# Patient Record
Sex: FEMALE | Race: WHITE | NOT HISPANIC OR LATINO | Employment: FULL TIME | URBAN - METROPOLITAN AREA
[De-identification: names, ages, dates, MRNs, and addresses within clinical notes are randomized per-mention and may not be internally consistent; named-entity substitution may affect disease eponyms.]

---

## 2017-08-16 ENCOUNTER — OFFICE VISIT (OUTPATIENT)
Dept: FAMILY MEDICINE | Facility: CLINIC | Age: 22
End: 2017-08-16

## 2017-08-16 VITALS
TEMPERATURE: 98.4 F | RESPIRATION RATE: 18 BRPM | HEART RATE: 70 BPM | OXYGEN SATURATION: 100 % | SYSTOLIC BLOOD PRESSURE: 97 MMHG | BODY MASS INDEX: 17.83 KG/M2 | WEIGHT: 104.4 LBS | DIASTOLIC BLOOD PRESSURE: 64 MMHG | HEIGHT: 64 IN

## 2017-08-16 DIAGNOSIS — Z00.129 ENCOUNTER FOR ROUTINE CHILD HEALTH EXAMINATION WITHOUT ABNORMAL FINDINGS: Primary | ICD-10-CM

## 2017-08-16 LAB
% GRANULOCYTES: 57.9 %G (ref 40–75)
GRANULOCYTES #: 4.2 K/UL (ref 1.6–8.3)
HCT VFR BLD AUTO: 43.9 % (ref 35–47)
HEMOGLOBIN: 14.3 G/DL (ref 11.7–15.7)
LYMPHOCYTES # BLD AUTO: 2.6 K/UL (ref 0.8–5.3)
LYMPHOCYTES NFR BLD AUTO: 35.3 %L (ref 20–48)
MCH RBC QN AUTO: 31 PG (ref 26.5–35)
MCHC RBC AUTO-ENTMCNC: 32.6 G/DL (ref 32–36)
MCV RBC AUTO: 95.1 FL (ref 78–100)
MID #: 0.5 K/UL (ref 0–2.2)
MID %: 6.8 %M (ref 0–20)
PLATELET # BLD AUTO: 284 K/UL (ref 150–450)
RBC # BLD AUTO: 4.62 M/UL (ref 3.8–5.2)
WBC # BLD AUTO: 7.3 K/UL (ref 4–11)

## 2017-08-16 NOTE — MR AVS SNAPSHOT
After Visit Summary   8/16/2017    Priscilla Raymond    MRN: [id number]           Patient Information     Date Of Birth          1995        Visit Information        Provider Department      8/16/2017 1:00 PM Brianna Harris MD [facility]        Today's Diagnoses     Encounter for routine child health examination without abnormal findings    -  1      Care Instructions    Here is the plan from today's visit    1. Encounter for routine child health examination without abnormal findings    - CBC with Diff Plt ([facility])  - HIV Antigen Antibody Combo          Please call or return to clinic if your symptoms don't go away.    Follow up plan  Please make a clinic appointment for follow up with me (BRIANNA HARRIS) as needed.    Thank you for coming to [facility] today.  Lab Testing:  **If you had lab testing today and your results are reassuring or normal they will be mailed to you or sent through [facility] within 7 days.   **If the lab tests need quick action we will call you with the results.  The phone number we will call with results is # [phone] (home) . If this is not the best number please call our clinic and change the number.  Medication Refills:  If you need any refills please call your pharmacy and they will contact us.   If you need to  your refill at a new pharmacy, please contact the new pharmacy directly. The new pharmacy will help you get your medications transferred faster.   Scheduling:  If you have any concerns about today's visit or wish to schedule another appointment please call our office during normal business hours [phone] (8-5:00 M-F)  If a referral was made to a [facility] and you don't get a call from central scheduling please call [phone].  If a Mammogram was ordered for you at [facility] call [phone] to schedule or change your appointment.  If you had an XRay/CT/Ultrasound/MRI ordered the  "number is [phone] to schedule or change your radiology appointment.   Medical Concerns:  If you have urgent medical concerns please call [phone] at any time of the day.  If you have a medical emergency please call 911.            Follow-ups after your visit        Who to contact     Please call your clinic at [phone] to:    Ask questions about your health    Make or cancel appointments    Discuss your medicines    Learn about your test results    Speak to your doctor   If you have compliments or concerns about an experience at your clinic, or if you wish to file a complaint, please contact [facility] Patient Relations at [phone] or email us at [email or web address]         Additional Information About Your Visit        [employer] Information     Nearbox is an electronic gateway that provides easy, online access to your medical records. With Nearbox, you can request a clinic appointment, read your test results, renew a prescription or communicate with your care team.     To sign up for Nearbox visit the website at [email or web address]   You will be asked to enter the access code listed below, as well as some personal information. Please follow the directions to create your username and password.     Your access code is: [email or web address]  Expires: 2017  1:52 PM     Your access code will  in 90 days. If you need help or a new code, please contact your [facility] or call [phone] for assistance.        Care EveryWhere ID     This is your Care EveryWhere ID. This could be used by other organizations to access your [facility] medical records  [id number]        Your Vitals Were     Pulse Temperature Respirations Height Pulse Oximetry BMI (Body Mass Index)    70 98.4  F (36.9  C) (Oral) 18 5' 4\" (162.6 cm) 100% 17.92 kg/m2       Blood Pressure from Last 3 Encounters:   17 97/64   16 (!) 86/59    Weight from " Last 3 Encounters:   08/16/17 104 lb 6.4 oz (47.4 kg)   08/20/16 100 lb (45.4 kg) (3 %)*     * Growth percentiles are based on CDC 2-20 Years data.              We Performed the Following     CBC with Diff Plt ([facility])     HIV Antigen Antibody Combo        Primary Care Provider    None       No address on file        Equal Access to Services     ANSELMO [facility]: Hadii aad ku hadasho Soomaali, waaxda luqadaha, qaybta kaalmada adeegyada, waxay idiin hayaan adeeg christofer laNeftaliaan . So [facility] [phone].    ATENCIÓN: Si habla español, tiene a buck disposición servicios gratuitos de asistencia lingüística. Llame al [phone].    We comply with applicable federal civil rights laws and [place] laws. We do not discriminate on the basis of race, color, national origin, age, disability sex, sexual orientation or gender identity.            Thank you!     Thank you for choosing [facility]  for your care. Our goal is always to provide you with excellent care. Hearing back from our patients is one way we can continue to improve our services. Please take a few minutes to complete the written survey that you may receive in the mail after your visit with us. Thank you!             Your Updated Medication List - Protect others around you: Learn how to safely use, store and throw away your medicines at www.disposemymeds.org.          This list is accurate as of: 8/16/17  1:52 PM.  Always use your most recent med list.                   Brand Name Dispense Instructions for use Diagnosis    adapalene 0.1 % cream    DIFFERIN    45 g    Apply topically At Bedtime    Acne       clindamycin 1 % topical gel    CLINDAMAX    30 g    Apply topically every morning    Acne       COMPOUNDED NON-CONTROLLED SUBSTANCE - PHARMACY TO MIX COMPOUNDED MEDICATION    CMPD RX    1 Container    100 gm total  0.02 nifedipine 0.015 lidocaine  White petroleum  Apply small amount twice daily to the affected area    Anal fissure        drospirenone-ethinyl estradiol 3-0.02 MG per tablet    ROGERIO    3 Package    Take 1 tablet by mouth daily    Acne       melatonin 1 MG Tabs tablet      Take 1 mg by mouth Take 1 mg by mouth at bedtime as needed.        polyethylene glycol powder    MIRALAX    510 g    Take 17 g (1 capful) by mouth daily as needed for constipation    Constipation, unspecified constipation type

## 2017-08-16 NOTE — PROGRESS NOTES
"  Child & Teen Check Up Year 18-20         Health History       Growth Percentile:    Wt Readings from Last 3 Encounters:   08/16/17 104 lb 6.4 oz (47.4 kg)   08/20/16 100 lb (45.4 kg) (3 %)*     * Growth percentiles are based on [facility] 2-20 Years data.      Ht Readings from Last 2 Encounters:   08/16/17 5' 4\" (162.6 cm)   08/20/16 5' 4\" (162.6 cm) (45 %)*     * Growth percentiles are based on [facility] 2-20 Years data.    Normalized BMI data available only for age 0 to 20 years.    Visit Vitals: BP 97/64  Pulse 70  Temp 98.4  F (36.9  C) (Oral)  Resp 18  Ht 5' 4\" (162.6 cm)  Wt 104 lb 6.4 oz (47.4 kg)  SpO2 100%  BMI 17.92 kg/m2  BP Percentile: Normalized stature-for-age data not available for patients older than 20 years.    Informant: Patient    Patient, Family speaks English and so an  was not used.  Family History: History reviewed. No pertinent family history.    Social History:   Social History     Social History     Marital status: Single     Spouse name: N/A     Number of children: N/A     Years of education: N/A     Social History Main Topics     Smoking status: Never Smoker     Smokeless tobacco: Never Used     Alcohol use None     Drug use: None     Sexual activity: Not Asked     Other Topics Concern     None     Social History Narrative       Medical History: History reviewed. No pertinent past medical history.    Family History and past Medical History reviewed and unchanged/updated.    Parental/or patient concerns: No concerns    Daily Activities:    Nutrition:    Describe intake: Cereal, bagel, toast, orange juice, coffee, chicken, vegetables, noodles, rice, fish, ice cream (chocolate), pizza, apples, cheese, milk shakes    Environmental Risks:  TB exposure: No  Guns in house:Stored in locked case or with trigger guards with ammunition separate.  HIV Testing USPSTF \"B\": Recommended and patient accepted testing.  Sexual partners have been men. Most of times uses condoms.     Dental:  Have you " "been to a dentist this year? Yes and verbally encouraged family to continue to have annual dental check-up       Mental Health:  Teen Screen Discussed?:        HEADSSS SCREENING:    HOME  Do you get along with your parents/siblings? Sometimes  Do you have at least one adult you can really talk to? Yes    EDUCATION  Do you have career or college plans after high school? Going to Nursing school in [employer]    ACTIVITIES  Do you get some exercise at least 3 times a week? Yes  Do you feel you are about the right weight for your height? Yes    DRUGS  Do you smoke cigarettes or chew tobacco? No  Do you drink alcohol or use any type of drugs? Sometimes.Smoked weed. Last time probably about a month ago.    SEX  Have you ever had sex? Yes and Not been tested positive for any STI's in the past.     SUICIDE/DEPRESSION  Do you ever feel down or depressed? No    SAFETY  Do you feel afraid in any of your relationships? No  Nutrition:  Eating disorders, Safety:  Seat belt. and Guidance:  Birth control, STDs, safer sex.       ROS   GENERAL: no recent fevers and activity level has been normal  SKIN: Negative for rash, birthmarks, acne, pigmentation changes  HEENT: Negative for hearing problems, vision problems, nasal congestion, eye discharge and eye redness  RESP: No cough, wheezing, difficulty breathing  CV: No cyanosis, fatigue with feeding  GI: Normal stools for age, no diarrhea or constipation   : Normal urination, no disharge or painful urination  MS: No swelling, muscle weakness, joint problems  NEURO: Moves all extremeties normally, normal activity for age  ALLERGY/IMMUNE: See allergy in history         Physical Exam:   BP 97/64  Pulse 70  Temp 98.4  F (36.9  C) (Oral)  Resp 18  Ht 5' 4\" (162.6 cm)  Wt 104 lb 6.4 oz (47.4 kg)  SpO2 100%  BMI 17.92 kg/m2     GENERAL: Alert, well nourished, well developed, no acute distress, interacts appropriately for age  SKIN: skin is clear, no rash, acne, " abnormal pigmentation or lesions  HEAD: The head is normocephalic.  EYES:The conjunctivae and cornea normal. PERRL, EOMI, Light reflex is symmetric and no eye movement on cover/uncover test. Sharp optic discs  EARS: The external auditory canals are clear and the tympanic membranes are normal; gray and transluscent.  NOSE: Clear, no discharge or congestion  MOUTH/THROAT: The throat is clear, tonsils:normal, no exudate or lesions. Normal teeth without obvious abnormalities  NECK: The neck is supple and thyroid is normal, no masses  LYMPH NODES: No adenopathy  LUNGS: The lung fields are clear to auscultation,no rales, rhonchi, wheezing or retractions  HEART: The precordium is quiet. Rhythm is regular. S1 and S2 are normal. No murmurs.  ABDOMEN: The bowel sounds are normal. Abdomen soft, non tender,  non distended, no masses or hepatosplenomegaly.  EXTREMITIES: Symmetric extremities, FROM, no deformities. Spine is straight, no scoliosis  NEUROLOGIC: No focal findings. Cranial nerves grossly intact: DTR's normal. Normal gait, strength and tone         Assessment and Plan   Reason for Visit:   Chief Complaint   Patient presents with     Physical     No referrals were made today.  Priscilla was seen today for physical.    Diagnoses and all orders for this visit:    Encounter for routine child health examination without abnormal findings  -     CBC with Diff Plt ([facility])  -     HIV Antigen Antibody Combo    -Patient has nexplanon for contraception.   Immunizations:    Hx immunization reactions?  No  Immunization schedule reviewed: Yes: MATT signed to get records from previous clinic.  Following immunizations advised:  Tdap (if not given when entering 7th grade) Up to date for this immunization  Labs:  Hemoglobin and HIV test ordered     Schedule next visit in 2 years    Brianna Bueno MD MPH  PGY3- [facility], Family Medicine  Pager- [phone]

## 2017-08-16 NOTE — Clinical Note
Hi Dr. Palomino, We signed a MATT to get an updated immunization record. Also, she had a nexplanon (i forgot to document that). I edited my note. Sending it back to you. Thanks, Brianna

## 2017-08-16 NOTE — PROGRESS NOTES
Preceptor Attestation:   Patient seen and discussed with the resident. Assessment and plan reviewed with resident and agreed upon.   Supervising Physician:  Jeanine Palomino MD  [facility]

## 2017-08-16 NOTE — LETTER
August 17, 2017      Priscilla Raymond  [address]  [place] [address]        Dear Priscilla,    Thank you for getting your care at [facility]. Please see below for your test results.    Resulted Orders   CBC with Diff Plt ([facility])   Result Value Ref Range    WBC 7.3 4.0 - 11.0 K/uL    Lymphocytes # 2.6 0.8 - 5.3 K/uL    % Lymphocytes 35.3 20.0 - 48.0 %L    Mid # 0.5 0.0 - 2.2 K/uL    Mid % 6.8 0.0 - 20.0 %M    GRANULOCYTES # 4.2 1.6 - 8.3 K/uL    % Granulocytes 57.9 40.0 - 75.0 %G    RBC 4.62 3.80 - 5.20 M/uL    Hemoglobin 14.3 11.7 - 15.7 g/dL    Hematocrit 43.9 35.0 - 47.0 %    MCV 95.1 78.0 - 100.0 fL    MCH 31.0 26.5 - 35.0 pg    MCHC 32.6 32.0 - 36.0 g/dL    Platelets 284.0 150.0 - 450.0 K/uL   HIV Antigen Antibody Combo   Result Value Ref Range    HIV Antigen Antibody Combo Nonreactive NR^Nonreactive          Comment:      HIV-1 p24 Ag & HIV-1/HIV-2 Ab Not Detected       If you have any concerns about these results please call and leave a message for me or send a [employer] message to the clinic.    Sincerely,    Brianna Bueno MD

## 2017-08-16 NOTE — PATIENT INSTRUCTIONS
Here is the plan from today's visit    1. Encounter for routine child health examination without abnormal findings    - CBC with Diff Plt ([facility])  - HIV Antigen Antibody Combo          Please call or return to clinic if your symptoms don't go away.    Follow up plan  Please make a clinic appointment for follow up with me (GUILLERMO HARRIS) as needed.    Thank you for coming to [facility] today.  Lab Testing:  **If you had lab testing today and your results are reassuring or normal they will be mailed to you or sent through [employer] within 7 days.   **If the lab tests need quick action we will call you with the results.  The phone number we will call with results is # [phone] (home) . If this is not the best number please call our clinic and change the number.  Medication Refills:  If you need any refills please call your pharmacy and they will contact us.   If you need to  your refill at a new pharmacy, please contact the new pharmacy directly. The new pharmacy will help you get your medications transferred faster.   Scheduling:  If you have any concerns about today's visit or wish to schedule another appointment please call our office during normal business hours [phone] (8-5:00 M-F)  If a referral was made to a [facility] and you don't get a call from central scheduling please call [phone].  If a Mammogram was ordered for you at [facility] call [phone] to schedule or change your appointment.  If you had an XRay/CT/Ultrasound/MRI ordered the number is [phone] to schedule or change your radiology appointment.   Medical Concerns:  If you have urgent medical concerns please call [phone] at any time of the day.  If you have a medical emergency please call [phone].

## 2017-08-17 LAB — HIV 1+2 AB+HIV1 P24 AG SERPL QL IA: NONREACTIVE

## 2017-08-29 PROBLEM — Z97.5 NEXPLANON IN PLACE: Status: ACTIVE | Noted: 2017-08-29

## 2019-05-16 ENCOUNTER — NURSE TRIAGE (OUTPATIENT)
Dept: NURSING | Facility: CLINIC | Age: 24
End: 2019-05-16

## 2019-06-26 ENCOUNTER — OFFICE VISIT (OUTPATIENT)
Dept: URGENT CARE | Facility: URGENT CARE | Age: 24
End: 2019-06-26
Payer: COMMERCIAL

## 2019-06-26 VITALS
SYSTOLIC BLOOD PRESSURE: 92 MMHG | OXYGEN SATURATION: 99 % | DIASTOLIC BLOOD PRESSURE: 63 MMHG | HEART RATE: 81 BPM | WEIGHT: 108 LBS | TEMPERATURE: 97.1 F | BODY MASS INDEX: 17.99 KG/M2 | HEIGHT: 65 IN

## 2019-06-26 DIAGNOSIS — H65.91 FLUID LEVEL BEHIND TYMPANIC MEMBRANE OF RIGHT EAR: ICD-10-CM

## 2019-06-26 DIAGNOSIS — H66.002 ACUTE SUPPURATIVE OTITIS MEDIA OF LEFT EAR WITHOUT SPONTANEOUS RUPTURE OF TYMPANIC MEMBRANE, RECURRENCE NOT SPECIFIED: Primary | ICD-10-CM

## 2019-06-26 PROCEDURE — 99213 OFFICE O/P EST LOW 20 MIN: CPT | Performed by: PHYSICIAN ASSISTANT

## 2019-06-26 RX ORDER — FLUTICASONE PROPIONATE 50 MCG
1 SPRAY, SUSPENSION (ML) NASAL DAILY
Qty: 16 G | Refills: 0 | Status: SHIPPED | OUTPATIENT
Start: 2019-06-26 | End: 2019-08-27

## 2019-06-26 RX ORDER — AMOXICILLIN 500 MG/1
500 CAPSULE ORAL 2 TIMES DAILY
Qty: 20 CAPSULE | Refills: 0 | Status: SHIPPED | OUTPATIENT
Start: 2019-06-26 | End: 2019-08-27

## 2019-06-26 ASSESSMENT — MIFFLIN-ST. JEOR: SCORE: 1250.14

## 2019-06-26 NOTE — PROGRESS NOTES
"SUBJECTIVE:  Priscilla Raymond is a 23 year old female who presents with bilateral ear fullness, and pain in the left ear for 1 month, worsening in the last day. She took a flight yesterday and   Severity: moderate   Timing:sudden onset and worsening.   Additional symptoms include runny nose and sneezing, allergy symptoms.      History of recurrent otitis: no    Past Medical History:   Diagnosis Date     Eating disorder, unspecified     Age 16     Current Outpatient Medications   Medication Sig Dispense Refill     amoxicillin (AMOXIL) 500 MG capsule Take 1 capsule (500 mg) by mouth 2 times daily for 10 days 20 capsule 0     fluticasone (FLONASE) 50 MCG/ACT nasal spray Spray 1 spray into both nostrils daily 16 g 0     melatonin 1 MG TABS Take 1 mg by mouth Take 1 mg by mouth at bedtime as needed.       polyethylene glycol (MIRALAX) powder Take 17 g (1 capful) by mouth daily as needed for constipation (Patient not taking: Reported on 8/16/2017) 510 g 0     Social History     Tobacco Use     Smoking status: Never Smoker     Smokeless tobacco: Never Used   Substance Use Topics     Alcohol use: Not on file       ROS:   Review of systems negative except as stated above.    OBJECTIVE:  BP 92/63   Pulse 81   Temp 97.1  F (36.2  C) (Oral)   Ht 1.65 m (5' 4.96\")   Wt 49 kg (108 lb)   SpO2 99%   BMI 17.99 kg/m     EXAM:  The right TM is bubbles     The right auditory canal is normal and without drainage, edema or erythema  The left TM is bulging, erythematous and air fluid level  The left auditory canal is normal and without drainage, edema or erythema  Oropharynx exam is normal: no lesions, erythema, adenopathy or exudate.  GENERAL: no acute distress  EYES: EOMI,  PERRL, conjunctiva clear  NECK: supple, non-tender to palpation, no adenopathy noted  RESP: lungs clear to auscultation - no rales, rhonchi or wheezes  CV: regular rates and rhythm, normal S1 S2, no murmur noted  SKIN: no suspicious lesions or rashes "     ASSESSMENT / PLAN:  1. Acute suppurative otitis media of left ear without spontaneous rupture of tympanic membrane, recurrence not specified  AOM on exam  Encouraged IBU for pain  Pain should improve in the next 3 days  - amoxicillin (AMOXIL) 500 MG capsule; Take 1 capsule (500 mg) by mouth 2 times daily for 10 days  Dispense: 20 capsule; Refill: 0    2. Fluid level behind tympanic membrane of right ear  Use flonase and Zyrtec daily during allergy season  - fluticasone (FLONASE) 50 MCG/ACT nasal spray; Spray 1 spray into both nostrils daily  Dispense: 16 g; Refill: 0    I have discussed the patient's diagnosis and my plan of treatment with the patient. We went over any labs or imaging. Patient is aware to come back in with worsening symptoms or if no relief despite treatment plan.  Patient verbalizes understanding. All questions were addressed and answered.   Corina Rodriguez PA-C

## 2019-07-25 DIAGNOSIS — H65.91 FLUID LEVEL BEHIND TYMPANIC MEMBRANE OF RIGHT EAR: ICD-10-CM

## 2019-07-25 RX ORDER — FLUTICASONE PROPIONATE 50 MCG
1 SPRAY, SUSPENSION (ML) NASAL DAILY
Qty: 16 G | Refills: 0 | Status: CANCELLED | OUTPATIENT
Start: 2019-07-25

## 2019-07-27 NOTE — TELEPHONE ENCOUNTER
"Last Written Prescription Date:  6/26/19  Last Fill Quantity: 16 g,  # refills: 0   Last office visit: 6/26/2019 with prescribing provider:  Jennifer   Future Office Visit:      Prescribed during urgent care visit    Requested Prescriptions   Pending Prescriptions Disp Refills     fluticasone (FLONASE) 50 MCG/ACT nasal spray 16 g 0     Sig: Spray 1 spray into both nostrils daily       Inhaled Steroids Protocol Failed - 7/25/2019  2:09 PM        Failed - Recent (12 mo) or future (30 days) visit within the authorizing provider's specialty     Patient had office visit in the last 12 months or has a visit in the next 30 days with authorizing provider or within the authorizing provider's specialty.  See \"Patient Info\" tab in inbasket, or \"Choose Columns\" in Meds & Orders section of the refill encounter.              Passed - Patient is age 12 or older        Passed - Medication is active on med list          "

## 2019-08-27 ENCOUNTER — OFFICE VISIT (OUTPATIENT)
Dept: OBGYN | Facility: CLINIC | Age: 24
End: 2019-08-27
Payer: COMMERCIAL

## 2019-08-27 VITALS
HEART RATE: 68 BPM | SYSTOLIC BLOOD PRESSURE: 94 MMHG | WEIGHT: 105 LBS | DIASTOLIC BLOOD PRESSURE: 60 MMHG | HEIGHT: 64 IN | BODY MASS INDEX: 17.93 KG/M2

## 2019-08-27 DIAGNOSIS — R30.0 DYSURIA: ICD-10-CM

## 2019-08-27 DIAGNOSIS — Z01.419 ENCOUNTER FOR GYNECOLOGICAL EXAMINATION WITHOUT ABNORMAL FINDING: Primary | ICD-10-CM

## 2019-08-27 LAB
ALBUMIN UR-MCNC: ABNORMAL MG/DL
APPEARANCE UR: CLEAR
BILIRUB UR QL STRIP: NEGATIVE
COLOR UR AUTO: YELLOW
GLUCOSE UR STRIP-MCNC: NEGATIVE MG/DL
HGB UR QL STRIP: ABNORMAL
KETONES UR STRIP-MCNC: NEGATIVE MG/DL
LEUKOCYTE ESTERASE UR QL STRIP: ABNORMAL
NITRATE UR QL: POSITIVE
NON-SQ EPI CELLS #/AREA URNS LPF: ABNORMAL /LPF
PH UR STRIP: 6 PH (ref 5–7)
RBC #/AREA URNS AUTO: ABNORMAL /HPF
SOURCE: ABNORMAL
SP GR UR STRIP: >1.03 (ref 1–1.03)
UROBILINOGEN UR STRIP-ACNC: 0.2 EU/DL (ref 0.2–1)
WBC #/AREA URNS AUTO: ABNORMAL /HPF

## 2019-08-27 PROCEDURE — 81001 URINALYSIS AUTO W/SCOPE: CPT | Performed by: NURSE PRACTITIONER

## 2019-08-27 PROCEDURE — G0123 SCREEN CERV/VAG THIN LAYER: HCPCS | Performed by: NURSE PRACTITIONER

## 2019-08-27 PROCEDURE — 99385 PREV VISIT NEW AGE 18-39: CPT | Performed by: NURSE PRACTITIONER

## 2019-08-27 PROCEDURE — 87088 URINE BACTERIA CULTURE: CPT | Performed by: NURSE PRACTITIONER

## 2019-08-27 PROCEDURE — 99213 OFFICE O/P EST LOW 20 MIN: CPT | Mod: 25 | Performed by: NURSE PRACTITIONER

## 2019-08-27 PROCEDURE — 87086 URINE CULTURE/COLONY COUNT: CPT | Performed by: NURSE PRACTITIONER

## 2019-08-27 PROCEDURE — 87186 SC STD MICRODIL/AGAR DIL: CPT | Performed by: NURSE PRACTITIONER

## 2019-08-27 RX ORDER — SULFAMETHOXAZOLE/TRIMETHOPRIM 800-160 MG
1 TABLET ORAL 2 TIMES DAILY
Qty: 30 TABLET | Refills: 0 | Status: SHIPPED | OUTPATIENT
Start: 2019-08-27 | End: 2019-12-14

## 2019-08-27 ASSESSMENT — ANXIETY QUESTIONNAIRES
2. NOT BEING ABLE TO STOP OR CONTROL WORRYING: NOT AT ALL
GAD7 TOTAL SCORE: 1
IF YOU CHECKED OFF ANY PROBLEMS ON THIS QUESTIONNAIRE, HOW DIFFICULT HAVE THESE PROBLEMS MADE IT FOR YOU TO DO YOUR WORK, TAKE CARE OF THINGS AT HOME, OR GET ALONG WITH OTHER PEOPLE: NOT DIFFICULT AT ALL
1. FEELING NERVOUS, ANXIOUS, OR ON EDGE: NOT AT ALL
3. WORRYING TOO MUCH ABOUT DIFFERENT THINGS: NOT AT ALL
7. FEELING AFRAID AS IF SOMETHING AWFUL MIGHT HAPPEN: NOT AT ALL
5. BEING SO RESTLESS THAT IT IS HARD TO SIT STILL: NOT AT ALL
6. BECOMING EASILY ANNOYED OR IRRITABLE: NOT AT ALL

## 2019-08-27 ASSESSMENT — PATIENT HEALTH QUESTIONNAIRE - PHQ9
5. POOR APPETITE OR OVEREATING: SEVERAL DAYS
SUM OF ALL RESPONSES TO PHQ QUESTIONS 1-9: 5

## 2019-08-27 ASSESSMENT — MIFFLIN-ST. JEOR: SCORE: 1221.28

## 2019-08-27 NOTE — LETTER
September 4, 2019      Priscilla Cisnerosan  [address] [address]    Dear ,      I am happy to inform you that your recent cervical cancer screening test (PAP smear) was normal.      Preventative screenings such as this help to ensure your health for years to come. You should repeat a pap smear in 3 years, unless otherwise directed.      You will still need to return to the clinic every year for your annual exam and other preventive tests.     If you have additional questions regarding this result, please call our registered nurse, Natasha at [phone].      Sincerely,      Annita Lowery, APRN CNP/rlm

## 2019-08-27 NOTE — PROGRESS NOTES
Priscilla is a 23 year old  female who presents for annual exam.     Besides routine health maintenance,  she would like to discuss possible UTI.    HPI: here for first annual.  Cycles are normal last 4 days.  Uses condoms for birth control, okay with that.  Just had all STD testing in July and was normal.  She is going to graduate from Nursing school  This .  She also c/o dysuria, urgency with urinating.  She states has a history of UTI's.  In discussion, sounds like they can be associated a lot of times after IC.      The patient doesn't have a PCP.      GYNECOLOGIC HISTORY:    Patient's last menstrual period was 2019 (approximate).  Her current contraception method is: sometimes condoms.  She  reports that she has never smoked. She has never used smokeless tobacco.    Patient is sexually active.  STD testing offered?  Declined, had done on 19  Last PHQ-9 score on record =   PHQ-9 SCORE 2019   PHQ-9 Total Score 5     Last GAD7 score on record =   AGUILA-7 SCORE 2019   Total Score 1     Alcohol Score = 4    HEALTH MAINTENANCE:  Cholesterol: None found.  Last Mammo: Never, Result: NA, due at age 41  Pap: Never, first one today  Colonoscopy:  Never  Dexa:  Never    Health maintenance updated:  no, needs pap    HISTORY:  OB History    Para Term  AB Living   1 0 0 0 1 0   SAB TAB Ectopic Multiple Live Births   0 1 0 0 0      # Outcome Date GA Lbr [employer]/2nd Weight Sex Delivery Anes PTL Lv   1 TAB                Patient Active Problem List   Diagnosis     Nexplanon in place     Past Surgical History:   Procedure Laterality Date     ------------OTHER-------------      Lipoma removal     AS SURG TX MISSED ABORTN,2ND TRI  2015      Social History     Tobacco Use     Smoking status: Never Smoker     Smokeless tobacco: Never Used   Substance Use Topics     Alcohol use: Yes      Problem (# of Occurrences) Relation (Name,Age of Onset)    Breast Cancer (1) Paternal Grandmother     "Cervical Cancer (1) Mother    Colon Cancer (1) Paternal Grandmother    Heart Disease (1) Maternal Grandmother    Lung Cancer (1) Maternal Grandmother    Osteoporosis (1) Maternal Grandmother            Current Outpatient Medications   Medication Sig     sulfamethoxazole-trimethoprim (BACTRIM DS/SEPTRA DS) 800-160 MG tablet Take 1 tablet by mouth 2 times daily for 5 days Then 1 after IC prn.     No current facility-administered medications for this visit.      Allergies   Allergen Reactions     Divide      Mold      Other reaction(s): *Unknown     Seasonal      Other reaction(s): *Unknown       Past medical, surgical, social and family histories were reviewed and updated in EPIC.    ROS:   12 point review of systems negative other than symptoms noted below.  Head: Nasal Congestion  Gastrointestinal: Constipation  Genitourinary: Frequency, Painful [place], Painful Urination, Pelvic Pain, Spotting, Urgency and Hematuria  Musculoskeletal: Joint Pain  Blood/Lymph: Nose Bleeds    EXAM:  BP 94/60   Pulse 68   Ht 1.626 m (5' 4\")   Wt 47.6 kg (105 lb)   LMP 07/28/2019 (Approximate)   BMI 18.02 kg/m     BMI: Body mass index is 18.02 kg/m .    PHYSICAL EXAM:  Constitutional:  Appearance: Well nourished, well developed, alert, in no acute distress  Neck:  Lymph Nodes:  No lymphadenopathy present    Thyroid:  Gland size normal, nontender, no nodules or masses present  on palpation  Chest:  Respiratory Effort:  Breathing unlabored  Cardiovascular:    Heart: Auscultation:  Regular rate, normal rhythm, no murmurs present  Breasts: Inspection of Breasts:  No lymphadenopathy present., Palpation of Breasts and Axillae:  No masses present on palpation, no breast tenderness., Axillary Lymph Nodes:  No lymphadenopathy present. and No nodularity, asymmetry or nipple discharge bilaterally.  Gastrointestinal:   Abdominal Examination:  Abdomen nontender to palpation, tone normal without rigidity or guarding, no masses present, " umbilicus without lesions   Liver and Spleen:  No hepatomegaly present, liver nontender to palpation    Hernias:  No hernias present  Lymphatic: Lymph Nodes:  No other lymphadenopathy present  Skin:  General Inspection:  No rashes present, no lesions present, no areas of  discoloration    Genitalia and Groin:  No rashes present, no lesions present, no areas of  discoloration, no masses present  Neurologic/Psychiatric:    Mental Status:  Oriented X3     Pelvic Exam:  External Genitalia:     Normal appearance for age, no discharge present, no tenderness present, no inflammatory lesions present, color normal  Vagina:     Normal vaginal vault without central or paravaginal defects, no discharge present, no inflammatory lesions present, no masses present  Bladder:     Nontender to palpation  Urethra:   Urethral Body:  Urethra palpation normal, urethra structural support normal   Urethral Meatus:  No erythema or lesions present  Cervix:     Appearance healthy, no lesions present, nontender to palpation, no bleeding present  Uterus:     Uterus: firm, normal sized and nontender, anteverted in position.   Adnexa:     No adnexal tenderness present, no adnexal masses present  Perineum:     Perineum within normal limits, no evidence of trauma, no rashes or skin lesions present  Anus:     Anus within normal limits, no hemorrhoids present  Inguinal Lymph Nodes:     No lymphadenopathy present  Pubic Hair:     Normal pubic hair distribution for age  Genitalia and Groin:     No rashes present, no lesions present, no areas of discoloration, no masses present  8/27/19  9:38 AM     Component Value Flag Ref Range Units Status Collected Lab   Color Urine Yellow     Final 08/27/2019  9:38 AM WE   Appearance Urine Clear     Final 08/27/2019  9:38 AM WE   Glucose Urine Negative   NEG^Negative mg/dL Final 08/27/2019  9:38 AM WE   Bilirubin Urine Negative   NEG^Negative  Final 08/27/2019  9:38 AM WE   Ketones Urine Negative    NEG^Negative mg/dL Final 08/27/2019  9:38 AM WE   Specific Gravity Urine >1.030   1.003 - 1.035  Final 08/27/2019  9:38 AM WE   pH Urine 6.0   5.0 - 7.0 pH Final 08/27/2019  9:38 AM WE   Protein Albumin Urine 2+  Abnormal   NEG^Negative mg/dL Final 08/27/2019  9:38 AM WE   Urobilinogen Urine 0.2   0.2 - 1.0 EU/dL Final 08/27/2019  9:38 AM WE   Nitrite Urine Positive  Abnormal   NEG^Negative  Final 08/27/2019  9:38 AM WE   Blood Urine 3+  Abnormal   NEG^Negative  Final 08/27/2019  9:38 AM WE   Leukocyte Esterase Urine 2+  Abnormal   NEG^Negative  Final 08/27/2019  9:38 AM WE   Source     Final 08/27/2019  9:38 AM WE   Midstream Urine    WBC Urine 5-10  Abnormal   OTO5^0 - 5 /HPF Final 08/27/2019  9:38 AM WE   RBC Urine   Abnormal   OTO2^O - 2 /HPF Final 08/27/2019  9:38 AM WE   Squamous Epithelial /LPF Urine Few   FEW^Few /LPF Final 08/27/2019  9:38 AM              COUNSELING:   Reviewed preventive health counseling, as reflected in patient instructions    BMI: Body mass index is 18.02 kg/m .      ASSESSMENT:  23 year old female with satisfactory annual exam.    ICD-10-CM    1. Encounter for gynecological examination without abnormal finding Z01.419 Pap imaged thin layer screen reflex to HPV if ASCUS - recommended age 25 - 29 years   2. Dysuria R30.0 UA with Microscopic     sulfamethoxazole-trimethoprim (BACTRIM DS/SEPTRA DS) 800-160 MG tablet     Urine Culture Aerobic Bacterial       PLAN:  Normal Gyn exam.  Patient to start on bactrim.  Discussed taking 1 after IC for preventive after 5 day treatment for UTI.   Will send a UC.  Return prn or 1 year for annual.  Pap every 3 years.    Annita Lowery, APRN CNP

## 2019-08-28 LAB
BACTERIA SPEC CULT: ABNORMAL
Lab: ABNORMAL
SPECIMEN SOURCE: ABNORMAL

## 2019-08-28 ASSESSMENT — ANXIETY QUESTIONNAIRES: GAD7 TOTAL SCORE: 1

## 2019-09-03 LAB
COPATH REPORT: NORMAL
PAP: NORMAL

## 2019-12-14 ENCOUNTER — HOSPITAL ENCOUNTER (EMERGENCY)
Facility: CLINIC | Age: 24
Discharge: HOME | End: 2019-12-14
Attending: EMERGENCY MEDICINE | Admitting: EMERGENCY MEDICINE
Payer: COMMERCIAL

## 2019-12-14 VITALS
SYSTOLIC BLOOD PRESSURE: 102 MMHG | HEIGHT: 64 IN | TEMPERATURE: 97.7 F | OXYGEN SATURATION: 98 % | HEART RATE: 73 BPM | BODY MASS INDEX: 18.27 KG/M2 | WEIGHT: 107 LBS | DIASTOLIC BLOOD PRESSURE: 63 MMHG | RESPIRATION RATE: 16 BRPM

## 2019-12-14 DIAGNOSIS — R10.2 PELVIC PAIN IN FEMALE: ICD-10-CM

## 2019-12-14 LAB
ALBUMIN UR-MCNC: 100 MG/DL
ANION GAP SERPL CALCULATED.3IONS-SCNC: 6 MMOL/L (ref 3–14)
APPEARANCE UR: ABNORMAL
BASOPHILS # BLD AUTO: 0 10E9/L (ref 0–0.2)
BASOPHILS NFR BLD AUTO: 0.1 %
BILIRUB UR QL STRIP: NEGATIVE
BUN SERPL-MCNC: 10 MG/DL (ref 7–30)
CALCIUM SERPL-MCNC: 9 MG/DL (ref 8.5–10.1)
CHLORIDE SERPL-SCNC: 107 MMOL/L (ref 94–109)
CO2 SERPL-SCNC: 24 MMOL/L (ref 20–32)
COLOR UR AUTO: YELLOW
CREAT SERPL-MCNC: 0.67 MG/DL (ref 0.52–1.04)
DIFFERENTIAL METHOD BLD: ABNORMAL
EOSINOPHIL # BLD AUTO: 0.1 10E9/L (ref 0–0.7)
EOSINOPHIL NFR BLD AUTO: 0.7 %
ERYTHROCYTE [DISTWIDTH] IN BLOOD BY AUTOMATED COUNT: 12.1 % (ref 10–15)
GFR SERPL CREATININE-BSD FRML MDRD: >90 ML/MIN/{1.73_M2}
GLUCOSE SERPL-MCNC: 101 MG/DL (ref 70–99)
GLUCOSE UR STRIP-MCNC: NEGATIVE MG/DL
HCG UR QL: NEGATIVE
HCT VFR BLD AUTO: 40.4 % (ref 35–47)
HGB BLD-MCNC: 13.6 G/DL (ref 11.7–15.7)
HGB UR QL STRIP: ABNORMAL
IMM GRANULOCYTES # BLD: 0.1 10E9/L (ref 0–0.4)
IMM GRANULOCYTES NFR BLD: 0.4 %
INTERNAL QC OK POCT: YES
KETONES UR STRIP-MCNC: NEGATIVE MG/DL
LEUKOCYTE ESTERASE UR QL STRIP: ABNORMAL
LYMPHOCYTES # BLD AUTO: 2.8 10E9/L (ref 0.8–5.3)
LYMPHOCYTES NFR BLD AUTO: 20.2 %
MCH RBC QN AUTO: 31.6 PG (ref 26.5–33)
MCHC RBC AUTO-ENTMCNC: 33.7 G/DL (ref 31.5–36.5)
MCV RBC AUTO: 94 FL (ref 78–100)
MONOCYTES # BLD AUTO: 0.8 10E9/L (ref 0–1.3)
MONOCYTES NFR BLD AUTO: 6.1 %
MUCOUS THREADS #/AREA URNS LPF: PRESENT /LPF
NEUTROPHILS # BLD AUTO: 9.9 10E9/L (ref 1.6–8.3)
NEUTROPHILS NFR BLD AUTO: 72.5 %
NITRATE UR QL: NEGATIVE
NRBC # BLD AUTO: 0 10*3/UL
NRBC BLD AUTO-RTO: 0 /100
PH UR STRIP: 6 PH (ref 5–7)
PLATELET # BLD AUTO: 252 10E9/L (ref 150–450)
POTASSIUM SERPL-SCNC: 4 MMOL/L (ref 3.4–5.3)
RBC # BLD AUTO: 4.31 10E12/L (ref 3.8–5.2)
RBC #/AREA URNS AUTO: 59 /HPF (ref 0–2)
SODIUM SERPL-SCNC: 138 MMOL/L (ref 133–144)
SOURCE: ABNORMAL
SP GR UR STRIP: 1.02 (ref 1–1.03)
SPECIMEN SOURCE: NORMAL
SQUAMOUS #/AREA URNS AUTO: 4 /HPF (ref 0–1)
TRANS CELLS #/AREA URNS HPF: 4 /HPF (ref 0–1)
UROBILINOGEN UR STRIP-MCNC: NORMAL MG/DL (ref 0–2)
WBC # BLD AUTO: 13.7 10E9/L (ref 4–11)
WBC #/AREA URNS AUTO: >182 /HPF (ref 0–5)
WET PREP SPEC: NORMAL

## 2019-12-14 PROCEDURE — 99284 EMERGENCY DEPT VISIT MOD MDM: CPT | Mod: Z6 | Performed by: EMERGENCY MEDICINE

## 2019-12-14 PROCEDURE — 96372 THER/PROPH/DIAG INJ SC/IM: CPT

## 2019-12-14 PROCEDURE — 25000132 ZZH RX MED GY IP 250 OP 250 PS 637: Performed by: EMERGENCY MEDICINE

## 2019-12-14 PROCEDURE — 87086 URINE CULTURE/COLONY COUNT: CPT | Performed by: EMERGENCY MEDICINE

## 2019-12-14 PROCEDURE — 81001 URINALYSIS AUTO W/SCOPE: CPT | Performed by: EMERGENCY MEDICINE

## 2019-12-14 PROCEDURE — 25800030 ZZH RX IP 258 OP 636: Performed by: EMERGENCY MEDICINE

## 2019-12-14 PROCEDURE — 87591 N.GONORRHOEAE DNA AMP PROB: CPT | Performed by: EMERGENCY MEDICINE

## 2019-12-14 PROCEDURE — 87186 SC STD MICRODIL/AGAR DIL: CPT | Performed by: EMERGENCY MEDICINE

## 2019-12-14 PROCEDURE — 96361 HYDRATE IV INFUSION ADD-ON: CPT

## 2019-12-14 PROCEDURE — 96360 HYDRATION IV INFUSION INIT: CPT

## 2019-12-14 PROCEDURE — 87088 URINE BACTERIA CULTURE: CPT | Performed by: EMERGENCY MEDICINE

## 2019-12-14 PROCEDURE — 99284 EMERGENCY DEPT VISIT MOD MDM: CPT | Mod: 25

## 2019-12-14 PROCEDURE — 80048 BASIC METABOLIC PNL TOTAL CA: CPT | Performed by: EMERGENCY MEDICINE

## 2019-12-14 PROCEDURE — 85025 COMPLETE CBC W/AUTO DIFF WBC: CPT | Performed by: EMERGENCY MEDICINE

## 2019-12-14 PROCEDURE — 87210 SMEAR WET MOUNT SALINE/INK: CPT | Performed by: EMERGENCY MEDICINE

## 2019-12-14 PROCEDURE — 81025 URINE PREGNANCY TEST: CPT | Performed by: EMERGENCY MEDICINE

## 2019-12-14 PROCEDURE — 25000128 H RX IP 250 OP 636: Performed by: EMERGENCY MEDICINE

## 2019-12-14 PROCEDURE — 87491 CHLMYD TRACH DNA AMP PROBE: CPT | Performed by: EMERGENCY MEDICINE

## 2019-12-14 RX ORDER — IBUPROFEN 200 MG
400 TABLET ORAL ONCE
Status: DISCONTINUED | OUTPATIENT
Start: 2019-12-14 | End: 2019-12-15 | Stop reason: HOSPADM

## 2019-12-14 RX ORDER — SODIUM CHLORIDE 9 MG/ML
1000 INJECTION, SOLUTION INTRAVENOUS CONTINUOUS
Status: DISCONTINUED | OUTPATIENT
Start: 2019-12-14 | End: 2019-12-15 | Stop reason: HOSPADM

## 2019-12-14 RX ORDER — LIDOCAINE HYDROCHLORIDE 10 MG/ML
INJECTION, SOLUTION EPIDURAL; INFILTRATION; INTRACAUDAL; PERINEURAL
Status: DISCONTINUED
Start: 2019-12-14 | End: 2019-12-15 | Stop reason: HOSPADM

## 2019-12-14 RX ORDER — AZITHROMYCIN 250 MG/1
1000 TABLET, FILM COATED ORAL ONCE
Status: COMPLETED | OUTPATIENT
Start: 2019-12-14 | End: 2019-12-14

## 2019-12-14 RX ORDER — CEFTRIAXONE SODIUM 250 MG
250 VIAL (EA) INJECTION ONCE
Status: COMPLETED | OUTPATIENT
Start: 2019-12-14 | End: 2019-12-14

## 2019-12-14 RX ORDER — IBUPROFEN 200 MG
400 TABLET ORAL EVERY 6 HOURS PRN
Qty: 60 TABLET | Refills: 0 | Status: SHIPPED | OUTPATIENT
Start: 2019-12-14 | End: 2019-12-27

## 2019-12-14 RX ADMIN — AZITHROMYCIN MONOHYDRATE 1000 MG: 250 TABLET ORAL at 23:17

## 2019-12-14 RX ADMIN — CEFTRIAXONE SODIUM 250 MG: 250 INJECTION, POWDER, FOR SOLUTION INTRAMUSCULAR; INTRAVENOUS at 23:18

## 2019-12-14 RX ADMIN — SODIUM CHLORIDE 1000 ML: 9 INJECTION, SOLUTION INTRAVENOUS at 19:16

## 2019-12-14 ASSESSMENT — ENCOUNTER SYMPTOMS
FREQUENCY: 1
DIARRHEA: 0
FLANK PAIN: 1
NAUSEA: 1
ABDOMINAL PAIN: 1

## 2019-12-14 ASSESSMENT — MIFFLIN-ST. JEOR: SCORE: 1225.35

## 2019-12-14 NOTE — ED AVS SNAPSHOT
[facility] [place], Emergency Department  [address] [place] [id number]  Phone:  [phone]                                    Priscilla Raymond   MRN: [id number]    Department:  [facility], Emergency Department   Date of Visit:  12/14/2019           After Visit Summary Signature Page    I have received my discharge instructions, and my questions have been answered. I have discussed any challenges I see with this plan with the nurse or doctor.    ..........................................................................................................................................  Patient/Patient Representative Signature      ..........................................................................................................................................  Patient Representative Print Name and Relationship to Patient    ..................................................               ................................................  Date                                   Time    ..........................................................................................................................................  Reviewed by Signature/Title    ...................................................              ..............................................  Date                                               Time          22EPIC Rev 08/18

## 2019-12-15 ENCOUNTER — TELEPHONE (OUTPATIENT)
Dept: EMERGENCY MEDICINE | Facility: CLINIC | Age: 24
End: 2019-12-15

## 2019-12-15 DIAGNOSIS — N39.0 URINARY TRACT INFECTION: ICD-10-CM

## 2019-12-15 LAB
BACTERIA SPEC CULT: ABNORMAL
C TRACH DNA SPEC QL NAA+PROBE: NEGATIVE
Lab: ABNORMAL
N GONORRHOEA DNA SPEC QL NAA+PROBE: NEGATIVE
SPECIMEN SOURCE: ABNORMAL
SPECIMEN SOURCE: NORMAL
SPECIMEN SOURCE: NORMAL

## 2019-12-15 RX ORDER — NITROFURANTOIN 25; 75 MG/1; MG/1
100 CAPSULE ORAL 2 TIMES DAILY
Qty: 10 CAPSULE | Refills: 0 | Status: SHIPPED | OUTPATIENT
Start: 2019-12-15 | End: 2019-12-27

## 2019-12-15 NOTE — ED PROVIDER NOTES
[facility] EMERGENCY DEPARTMENT ([facility])  December 14, 2019    History     Chief Complaint   Patient presents with     Urinary Frequency     HPI  Priscilla Raymond is a 24 year old female with reported history of recurrent UTIs who presents the ED for UTI-like symptoms.  Patient states that yesterday she developed urinary frequency and urgency, but states that this UTI is developed suprapubic abdominal pain that started around 11 AM after sexual intercourse.  She states she did have some discomfort during intercourse.  She also complains of bilateral flank pain and nausea.  Patient states that her pain is made worse with straining in the bathroom.  She states her last menses 11/25/19 and was normal.  She states that she does not use birth control or contraceptives and has been with her current sexual partner for the past 1.5 years.  She denies previous history of STD and does not currently have a PCP.  She otherwise denies new sexual partners, diarrhea, vaginal discharge, vaginal bleeding, rash, swelling, or other medical problems.    PAST MEDICAL HISTORY  Past Medical History:   Diagnosis Date     Chlamydia      Eating disorder, unspecified     Age 16     UTI (urinary tract infection)     frequent     PAST SURGICAL HISTORY  Past Surgical History:   Procedure Laterality Date     ------------OTHER-------------      Lipoma removal     AS SURG TX MISSED ABORTN,2ND TRI  05/2015     FAMILY HISTORY  Family History   Problem Relation Age of Onset     Cervical Cancer Mother      Heart Disease Maternal Grandmother      Osteoporosis Maternal Grandmother      Lung Cancer Maternal Grandmother      Breast Cancer Paternal Grandmother      Colon Cancer Paternal Grandmother      SOCIAL HISTORY  Social History     Tobacco Use     Smoking status: Never Smoker     Smokeless tobacco: Never Used   Substance Use Topics     Alcohol use: Yes     MEDICATIONS  Current Facility-Administered Medications   Medication     0.9%  "sodium chloride BOLUS    Followed by     sodium chloride 0.9% infusion     No current outpatient medications on file.     ALLERGIES  Allergies   Allergen Reactions     [place]      Mold      Other reaction(s): *Unknown     Seasonal      Other reaction(s): *Unknown         I have reviewed the Medications, Allergies, Past Medical and Surgical History, and Social History in the Epic system.    Review of Systems   Gastrointestinal: Positive for abdominal pain (suprapubic) and nausea. Negative for diarrhea.   Genitourinary: Positive for flank pain (bilateral), frequency and urgency. Negative for vaginal bleeding and vaginal discharge.   Skin: Negative for rash.   All other systems reviewed and are negative.      Physical Exam   BP: 114/52  Heart Rate: 105  Temp: 97.7  F (36.5  C)  Resp: 16  Height: 162.6 cm (5' 4\")  Weight: 48.5 kg (107 lb)  SpO2: 100 %      Physical Exam  Vitals signs and nursing note reviewed. Exam conducted with a chaperone present.   Constitutional:       General: She is not in acute distress.     Appearance: Normal appearance. She is not diaphoretic.   HENT:      Head: Atraumatic.      Mouth/Throat:      Pharynx: No oropharyngeal exudate.   Eyes:      General: No scleral icterus.     Pupils: Pupils are equal, round, and reactive to light.   Neck:      Musculoskeletal: Normal range of motion and neck supple.   Cardiovascular:      Rate and Rhythm: Normal rate and regular rhythm.      Heart sounds: Normal heart sounds.   Pulmonary:      Effort: No respiratory distress.      Breath sounds: Normal breath sounds.   Abdominal:      General: Bowel sounds are normal.      Palpations: Abdomen is soft.      Tenderness: There is no abdominal tenderness.   Genitourinary:     General: Normal vulva.      Vagina: Vaginal discharge present.   Musculoskeletal:         General: No tenderness.   Skin:     General: Skin is warm.      Findings: No rash.   Neurological:      General: No focal deficit present.      " Mental Status: She is alert and oriented to person, place, and time.         ED Course        Procedures             Critical Care time:  none             Labs Ordered and Resulted from Time of ED Arrival Up to the Time of Departure from the ED   HCG QUAL URINE POCT - Normal   UA MACROSCOPIC WITH REFLEX TO MICRO AND CULTURE            Assessments & Plan (with Medical Decision Making)     24 year old female with reported history of recurrent UTIs who presents the ED for UTI-like symptoms.  Patient states that yesterday she developed urinary frequency and urgency, but states that this UTI is developed suprapubic abdominal pain that started around 11 AM after sexual intercourse.  Patient states the symptoms not characteristic of her prior UTI, suggestive more of STD.  IV established, labs drawn sent reviewed document in epic with a WBC of 13.7 but otherwise normal CBC, electrolytes normal, UA positive for pyuria and leukoesterase and blood.  Given the patient's cervical motion tenderness on pelvic will go ahead and treat empirically for STD with ceftriaxone and Zithromax.  Patient counseled regarding pending urine culture with expected call back by culture nurse if positive for UTI.  Otherwise patient will follow-up with primary care provider within 3 to 4 days for further evaluation and care.    I have reviewed the nursing notes.    I have reviewed the findings, diagnosis, plan and need for follow up with the patient.    New Prescriptions    No medications on file       Final diagnoses:   Pelvic pain in female     IProsper, am serving as a trained medical scribe to document services personally performed by Mami Steel MD, based on the provider normal statements to me.      Mami LANIER MD, was physically present and have reviewed and verified the accuracy of this note documented by Prosper Perez.       12/14/2019   [facility], EMERGENCY DEPARTMENT     Mami Steel MD  12/16/19 1930

## 2019-12-15 NOTE — ED TRIAGE NOTES
Priscilla ambulatory to triage with c/o urinary frequency/urgency since yesterday and moderate to severe pelvic pain starting today after having intercourse.

## 2019-12-15 NOTE — DISCHARGE INSTRUCTIONS
Please make an appointment to follow up with [facility] (phone: [phone]) in 3-4 days to follow up on your lab tests.

## 2019-12-15 NOTE — TELEPHONE ENCOUNTER
[facility] Emergency Department Lab result notification [Adult-Female]    [place] ED lab result protocol used  Urine culture    Reason for call  Notify of lab results, assess symptoms,  review ED providers recommendations/discharge instructions (if necessary) and advise per ED lab result f/u protocol    Lab Result (including Rx patient on, if applicable)  Preliminary urine culture report on 12/15/19 shows the presence of bacteria(s):  >100,000 colonies/mL Escherichia coli   Emergency Dept/Urgent Care discharge antibiotic: None  Recommendations per [place] ED Lab result protocol - Urine culture protocol.  Information table from ED Provider visit on 12/14/19  Symptoms reported at ED visit (Chief complaint, HPI) Urinary Frequency      HPI  Priscilla Raymond is a 24 year old female with reported history of recurrent UTIs who presents the ED for UTI-like symptoms.  Patient states that yesterday she developed urinary frequency and urgency, but states that this UTI is developed suprapubic abdominal pain that started around 11 AM after sexual intercourse.  She states she did have some discomfort during intercourse.  She also complains of bilateral flank pain and nausea.  Patient states that her pain is made worse with straining in the bathroom.  She states her last menses 11/25/19 and was normal.  She states that she does not use birth control or contraceptives and has been with her current sexual partner for the past 1.5 years.  She denies previous history of STD and does not currently have a PCP.  She otherwise denies new sexual partners, diarrhea, vaginal discharge, vaginal bleeding, rash, swelling, or other medical problems.     Significant Medical hx, if applicable (i.e. CKD, diabetes) None   Allergies Allergies   Allergen Reactions     [place]      Mold      Other reaction(s): *Unknown     Seasonal      Other reaction(s): *Unknown      Weight, if applicable Wt Readings from Last 2 Encounters:   12/14/19  48.5 kg (107 lb)   08/27/19 47.6 kg (105 lb)      Coumadin/Warfarin [Yes /No] No   Creatinine Level (mg/dl) Creatinine   Date Value Ref Range Status   12/14/2019 0.67 0.52 - 1.04 mg/dL Final      Creatinine clearance (ml/min), if applicable Serum creatinine: 0.67 mg/dL 12/14/19 1915  Estimated creatinine clearance: 100 mL/min   Pregnant (Yes/No/NA) No   Breastfeeding (Yes/No/NA) No   ED providers Impression and Plan (applicable information) Provider note is incomplete   ED diagnosis  Urinary Frequency   ED provider Mami Steel MD,      RN Assessment (Patient s current Symptoms), include time called.  [Insert Left message here if message left]  1:09PM: Spoke with patient. She states she is feeling much better today. Continues to have urinary symptoms, frequency, urgency, pain with urination. Denies fever, no vomiting.     RN Recommendations/Instructions per [facility] ED lab result protocol  Patient notified of lab result and treatment recommendations.  Rx for Nitrofurantoin Macrocrystal-Monohydrate (Macrobid) 100 mg PO capsule, 1 capsule (100 mg) by mouth 2 times daily for 5 days sent to [Pharm[facility] [address]].    Advised to follow up with PCP as directed by the ED provider.  Patient states understanding of the information given and has no further questions.      Please Contact your PCP clinic or return to the Emergency department if your:    Symptoms worsen or other concerning symptom's.    PCP follow-up Questions asked: YES       [RN Name]  Chiquis Laguerre RN  [facility]ocutiser [facility] RN  Lung Nodule and ED Lab Result RN  Epic pool (ED late result f/u RN): [id number]  [facility] INCIDENTAL RADIOLOGY F/U NURSES: P [id number]  Ph# [phone]    Copy of Lab result   Urine Culture Aerobic Bacterial [[id number]] (Order [id number])   Order Requisition     Urine Culture Aerobic Bacterial (Order #[id number]) on 12/14/19   Preliminary Result   Exam Information     Exam Date Exam Time Accession # Results     12/14/19  6:27 PM [id number]    Specimen Information: Unspecified Urine        Component Collected Lab   Specimen Description 12/14/2019  6:27    Unspecified Urine    Special Requests 12/14/2019  6:27    Specimen received in preservative    Culture Micro Abnormal  12/14/2019  6:27    >100,000 colonies/mL   Escherichia coli   Susceptibility testing in progress

## 2019-12-16 NOTE — RESULT ENCOUNTER NOTE
Final Urine Culture Report on 12/15/19  Emergency Dept/Urgent Care discharge antibiotic prescribed: None;  On 12/15/19 per ED Lab result protocol initiated Rx for Nitrofurantoin Macrocrystal-Monohydrate (Macrobid) 100 mg PO capsule, 1 capsule (100 mg) by mouth 2 times daily for 5 days  #1. Bacteria, >100,000 colonies/mL Escherichia coli, is SUSCEPTIBLE to Antibiotic.    As per [facility] ED Lab Result protocol, no change in antibiotic therapy.

## 2019-12-26 NOTE — PROGRESS NOTES
SUBJECTIVE:                                                   Priscilla Raymond is a 24 year old female who presents to clinic today for the following health issue(s):  Patient presents with:  Pelvic Pain: patient has had 2 episodes of pain several hours after intercourse.    Additional information: was seen recently in ED and treated for UTI, has h/o frequent UTI's    HPI:  Patient was seen in the ER  for urinary concerns and new onset pain with intercourse.  She had std testing that was negative.  Ultrasound was not performed.  She had a couple episodes of pain after intercourse.   Patient had her pain right after intercourse.  Patient had sudden sharp pain.  Radiated to her lower back.  Patient had bleeding starting yesterday.      Patient had taken antibiotics after intercourse to try to prevent UTIs.  Patient with UTIs 6 or more times a year.      Patient's last menstrual period was 2019..     Patient is sexually active, .  Using condoms for contraception.    reports that she has never smoked. She has never used smokeless tobacco.  STD testing offered?  recently tested  Health maintenance updated:  yes    Today's PHQ-2 Score:   PHQ-2 (  [employer]) 2017   Q1: Little interest or pleasure in doing things 0   Q2: Feeling down, depressed or hopeless 0   PHQ-2 Score 0     Today's PHQ-9 Score:   PHQ-9 SCORE 2019   PHQ-9 Total Score 5     Today's AGUILA-7 Score:   AGUILA-7 SCORE 2019   Total Score 1       Problem list and histories reviewed & adjusted, as indicated.  Additional history: as documented.    Patient Active Problem List   Diagnosis     Nexplanon in place     Past Surgical History:   Procedure Laterality Date     ------------OTHER-------------      Lipoma removal     AS SURG TX MISSED ABORTN,2ND TRI  2015      Social History     Tobacco Use     Smoking status: Never Smoker     Smokeless tobacco: Never Used   Substance Use Topics     Alcohol use: Yes      Problem (# of  "Occurrences) Relation (Name,Age of Onset)    Breast Cancer (1) Paternal Grandmother    Cervical Cancer (1) Mother    Colon Cancer (1) Paternal Grandmother    Heart Disease (1) Maternal Grandmother    Lung Cancer (1) Maternal Grandmother    Osteoporosis (1) Maternal Grandmother            Current Outpatient Medications   Medication Sig     sulfamethoxazole-trimethoprim (BACTRIM DS/SEPTRA DS) 800-160 MG tablet Take 1 tablet by mouth once as needed (with intercourse)     No current facility-administered medications for this visit.      Allergies   Allergen Reactions     [place]      Mold      Other reaction(s): *Unknown     Seasonal      Other reaction(s): *Unknown       ROS:  12 point review of systems negative other than symptoms noted below or in the HPI.  Genitourinary: Cramps  No urinary frequency or dysuria, bladder or kidney problems, urinary frequency      OBJECTIVE:     BP (!) 82/58   Ht 1.626 m (5' 4\")   Wt 49 kg (108 lb)   LMP 12/26/2019   BMI 18.54 kg/m    Body mass index is 18.54 kg/m .    Exam:  Constitutional:  Appearance: Well nourished, well developed alert, in no acute distress  Chest:  Respiratory Effort:  Breathing unlabored. Clear to auscultation bilaterally.   Cardiovascular: Heart: Auscultation:  Regular rate, normal rhythm, no murmurs present  Skin: General Inspection:  No rashes present, no lesions present, no areas of discoloration.  Neurologic:  Mental Status:  Oriented X3.  Normal strength and tone, sensory exam grossly normal, mentation intact and speech normal.    Psychiatric:  Mentation appears normal and affect normal/bright.  Pelvic Exam:  External Genitalia:     Normal appearance for age, no discharge present, no tenderness present, no inflammatory lesions present, color normal  Vagina:     Normal vaginal vault without central or paravaginal defects, no discharge present, no inflammatory lesions present, no masses present  Bladder:     Nontender to palpation  Urethra:   Urethral " Body:  Urethra palpation normal, urethra structural support normal   Urethral Meatus:  No erythema or lesions present  Cervix:     Appearance healthy, no lesions present, nontender to palpation, no bleeding present  Uterus:     Uterus: firm, normal sized and nontender, retroverted in position.   Adnexa:     No adnexal tenderness present, no adnexal masses present  Perineum:     Perineum within normal limits, no evidence of trauma, no rashes or skin lesions present  Anus:     Anus within normal limits, no hemorrhoids present  Inguinal Lymph Nodes:     No lymphadenopathy present  Pubic Hair:     Normal pubic hair distribution for age  Genitalia and Groin:     No rashes present, no lesions present, no areas of discoloration, no masses present  Levator spasm bilaterally         In-Clinic Test Results:  No results found for this or any previous visit (from the past 24 hour(s)).    ASSESSMENT/PLAN:                                                        ICD-10-CM    1. Deep dyspareunia N94.12 US Transvaginal Non OB     HCG quantitative pregnancy   2. Urinary frequency R35.0 Urine Culture Aerobic Bacterial   3. Frequent UTI N39.0 sulfamethoxazole-trimethoprim (BACTRIM DS/SEPTRA DS) 800-160 MG tablet   4. Levator spasm M62.838 PHYSICAL THERAPY REFERRAL       There are no Patient Instructions on file for this visit.    1. Frequent UTIs- Urine culture today.  Discussed anitbiotic after intercourse and perscription sent in.  2. Painful intercourse- ultrasound ordered.  HCG today.  Exam normal and reassuring, some levator spasm.  PT referral sent over    25 minutes was spent face to face with the patient today discussing her history, diagnosis, and follow-up plan as noted above. Over 50% of the visit was spent in counseling and coordination of care.          aSrika Campos MD  [facility]

## 2019-12-27 ENCOUNTER — OFFICE VISIT (OUTPATIENT)
Dept: OBGYN | Facility: CLINIC | Age: 24
End: 2019-12-27
Payer: COMMERCIAL

## 2019-12-27 VITALS
SYSTOLIC BLOOD PRESSURE: 82 MMHG | WEIGHT: 108 LBS | BODY MASS INDEX: 18.44 KG/M2 | DIASTOLIC BLOOD PRESSURE: 58 MMHG | HEIGHT: 64 IN

## 2019-12-27 DIAGNOSIS — R35.0 URINARY FREQUENCY: ICD-10-CM

## 2019-12-27 DIAGNOSIS — N39.0 FREQUENT UTI: ICD-10-CM

## 2019-12-27 DIAGNOSIS — M62.838 LEVATOR SPASM: ICD-10-CM

## 2019-12-27 DIAGNOSIS — N94.12 DEEP DYSPAREUNIA: Primary | ICD-10-CM

## 2019-12-27 LAB — B-HCG SERPL-ACNC: <1 IU/L (ref 0–5)

## 2019-12-27 PROCEDURE — 84702 CHORIONIC GONADOTROPIN TEST: CPT | Performed by: OBSTETRICS & GYNECOLOGY

## 2019-12-27 PROCEDURE — 87086 URINE CULTURE/COLONY COUNT: CPT | Performed by: OBSTETRICS & GYNECOLOGY

## 2019-12-27 PROCEDURE — 99214 OFFICE O/P EST MOD 30 MIN: CPT | Performed by: OBSTETRICS & GYNECOLOGY

## 2019-12-27 PROCEDURE — 36415 COLL VENOUS BLD VENIPUNCTURE: CPT | Performed by: OBSTETRICS & GYNECOLOGY

## 2019-12-27 RX ORDER — SULFAMETHOXAZOLE/TRIMETHOPRIM 800-160 MG
1 TABLET ORAL
Qty: 30 TABLET | Refills: 3 | Status: SHIPPED | OUTPATIENT
Start: 2019-12-27 | End: 2021-01-11

## 2019-12-27 ASSESSMENT — MIFFLIN-ST. JEOR: SCORE: 1229.88

## 2019-12-28 LAB
BACTERIA SPEC CULT: NORMAL
Lab: NORMAL
SPECIMEN SOURCE: NORMAL

## 2020-01-06 NOTE — PROGRESS NOTES
SUBJECTIVE:                                                   Priscilla Raymond is a 24 year old female who presents to clinic today for the following health issue(s):  Patient presents with:  Ultrasound: f/u to deep dyspareunia, painful intercourse      HPI:  Was seen in ER  for pain after IC. Presentation sounded like ruptured cyst.   No ultrasound done.  Here for ultrasound. LMP . Now midcycle.   Has 3cm simple cyst of left ovary. No free fluid. Normal uterus.  Pt has no pain. She has not been sexually active since.    Patient's last menstrual period was 2019..     Patient is sexually active, .  Using condoms for contraception.    reports that she has never smoked. She has never used smokeless tobacco.    STD testing offered?  Declined    Health maintenance updated:  yes    Problem list and histories reviewed & adjusted, as indicated.  Additional history: as documented.    Patient Active Problem List   Diagnosis     Nexplanon in place     Past Surgical History:   Procedure Laterality Date     ------------OTHER-------------      Lipoma removal     AS SURG TX MISSED ABORTN,2ND TRI  2015      Social History     Tobacco Use     Smoking status: Never Smoker     Smokeless tobacco: Never Used   Substance Use Topics     Alcohol use: Yes      Problem (# of Occurrences) Relation (Name,Age of Onset)    Breast Cancer (1) Paternal Grandmother    Cervical Cancer (1) Mother    Colon Cancer (1) Paternal Grandmother    Heart Disease (1) Maternal Grandmother    Lung Cancer (1) Maternal Grandmother    No Known Problems (5) Father, Sister, Brother, Maternal Grandfather, Other    Osteoporosis (1) Maternal Grandmother            Current Outpatient Medications   Medication Sig     sulfamethoxazole-trimethoprim (BACTRIM DS/SEPTRA DS) 800-160 MG tablet Take 1 tablet by mouth once as needed (with intercourse) (Patient not taking: Reported on 1/10/2020)     No current facility-administered medications for this  "visit.      Allergies   Allergen Reactions     [place]      Mold      Other reaction(s): *Unknown     Seasonal      Other reaction(s): *Unknown       ROS:  12 point review of systems negative other than symptoms noted below or in the HPI.  Genitourinary: Painful [place]        OBJECTIVE:     /62   Pulse 68   Ht 1.626 m (5' 4\")   Wt 49 kg (108 lb)   LMP 12/26/2019   BMI 18.54 kg/m    Body mass index is 18.54 kg/m .    Exam:  Constitutional:  Appearance: Well nourished, well developed alert, in no acute distress  Neurologic:  Mental Status:  Oriented X3.  Normal strength and tone, sensory exam grossly normal, mentation intact and speech normal.    Psychiatric:  Mentation appears normal and affect normal/bright.     In-Clinic Test Results:  No results found for this or any previous visit (from the past 24 hour(s)).    ASSESSMENT/PLAN:                                                        ICD-10-CM    1. Deep dyspareunia N94.12          Reassured with normal ultrasound. Probable ruptured cyst in DEC.  Will see if pain recurs. Discussed OCPs but not shown to definitely prevent future cyst formation. Will see if has recurrence. Can always call for OCPs    Nimesh Mcdonald MD  [facility] [place]  "

## 2020-01-10 ENCOUNTER — ANCILLARY PROCEDURE (OUTPATIENT)
Dept: ULTRASOUND IMAGING | Facility: CLINIC | Age: 25
End: 2020-01-10
Attending: OBSTETRICS & GYNECOLOGY
Payer: COMMERCIAL

## 2020-01-10 ENCOUNTER — OFFICE VISIT (OUTPATIENT)
Dept: OBGYN | Facility: CLINIC | Age: 25
End: 2020-01-10
Attending: OBSTETRICS & GYNECOLOGY
Payer: COMMERCIAL

## 2020-01-10 VITALS
BODY MASS INDEX: 18.44 KG/M2 | HEIGHT: 64 IN | SYSTOLIC BLOOD PRESSURE: 104 MMHG | HEART RATE: 68 BPM | WEIGHT: 108 LBS | DIASTOLIC BLOOD PRESSURE: 62 MMHG

## 2020-01-10 DIAGNOSIS — N94.12 DEEP DYSPAREUNIA: Primary | ICD-10-CM

## 2020-01-10 DIAGNOSIS — N94.12 DEEP DYSPAREUNIA: ICD-10-CM

## 2020-01-10 PROCEDURE — 76830 TRANSVAGINAL US NON-OB: CPT | Performed by: OBSTETRICS & GYNECOLOGY

## 2020-01-10 PROCEDURE — 99213 OFFICE O/P EST LOW 20 MIN: CPT | Performed by: OBSTETRICS & GYNECOLOGY

## 2020-01-10 ASSESSMENT — MIFFLIN-ST. JEOR: SCORE: 1229.88

## 2020-04-11 ENCOUNTER — OFFICE VISIT (OUTPATIENT)
Dept: URGENT CARE | Facility: URGENT CARE | Age: 25
End: 2020-04-11
Payer: COMMERCIAL

## 2020-04-11 VITALS
TEMPERATURE: 98.2 F | BODY MASS INDEX: 18.44 KG/M2 | HEIGHT: 64 IN | OXYGEN SATURATION: 98 % | HEART RATE: 74 BPM | DIASTOLIC BLOOD PRESSURE: 61 MMHG | SYSTOLIC BLOOD PRESSURE: 102 MMHG | WEIGHT: 108 LBS

## 2020-04-11 DIAGNOSIS — N76.0 BACTERIAL VAGINOSIS: Primary | ICD-10-CM

## 2020-04-11 DIAGNOSIS — B96.89 BACTERIAL VAGINOSIS: Primary | ICD-10-CM

## 2020-04-11 DIAGNOSIS — R30.0 DYSURIA: ICD-10-CM

## 2020-04-11 DIAGNOSIS — N89.8 VAGINAL DISCHARGE: ICD-10-CM

## 2020-04-11 LAB
ALBUMIN UR-MCNC: NEGATIVE MG/DL
APPEARANCE UR: CLEAR
BILIRUB UR QL STRIP: NEGATIVE
COLOR UR AUTO: YELLOW
GLUCOSE UR STRIP-MCNC: NEGATIVE MG/DL
HGB UR QL STRIP: NEGATIVE
KETONES UR STRIP-MCNC: NEGATIVE MG/DL
LEUKOCYTE ESTERASE UR QL STRIP: NEGATIVE
NITRATE UR QL: NEGATIVE
PH UR STRIP: 8 PH (ref 5–7)
SOURCE: ABNORMAL
SP GR UR STRIP: 1.02 (ref 1–1.03)
SPECIMEN SOURCE: ABNORMAL
UROBILINOGEN UR STRIP-ACNC: 1 EU/DL (ref 0.2–1)
WET PREP SPEC: ABNORMAL

## 2020-04-11 PROCEDURE — 81003 URINALYSIS AUTO W/O SCOPE: CPT | Performed by: INTERNAL MEDICINE

## 2020-04-11 PROCEDURE — 87210 SMEAR WET MOUNT SALINE/INK: CPT | Performed by: INTERNAL MEDICINE

## 2020-04-11 PROCEDURE — 87591 N.GONORRHOEAE DNA AMP PROB: CPT | Performed by: INTERNAL MEDICINE

## 2020-04-11 PROCEDURE — 99213 OFFICE O/P EST LOW 20 MIN: CPT | Performed by: INTERNAL MEDICINE

## 2020-04-11 PROCEDURE — 87491 CHLMYD TRACH DNA AMP PROBE: CPT | Performed by: INTERNAL MEDICINE

## 2020-04-11 RX ORDER — METRONIDAZOLE 500 MG/1
500 TABLET ORAL 2 TIMES DAILY
Qty: 14 TABLET | Refills: 0 | Status: SHIPPED | OUTPATIENT
Start: 2020-04-11 | End: 2020-04-18

## 2020-04-11 ASSESSMENT — MIFFLIN-ST. JEOR: SCORE: 1229.88

## 2020-04-11 NOTE — PATIENT INSTRUCTIONS
Patient Education     Vaginal Infection: Bacterial Vaginosis  Both good and bad bacteria are present in a healthy vagina. Bacterial vaginosis (BV) occurs when these bacteria get out of balance. The numbers of good bacteria decrease. This allows the numbers of bad bacteria to increase and cause BV. In most cases, BV is not a serious problem.  Causes of bacterial vaginosis  The cause of BV is not clear. Douching may lead to it. Having sex with a new partner or more than 1 partner makes it more likely.  Symptoms of bacterial vaginosis  Symptoms of BV vary for each woman. Some women have few symptoms or none at all. If symptoms are present, they can include:    Thin, milky white or gray or sometimes green discharge    Unpleasant  fishy  odor    Irritation, itching, and burning at opening of vagina which may indicate mixed vaginitis      Burning or irritation with sex or urination which may indicate mixed vaginitis  Diagnosing bacterial vaginosis  Your healthcare provider will ask about your symptoms and health history. He or she will also do a pelvic exam. This is an exam of your vagina and cervix. A sample of vaginal fluid or discharge may be taken. This sample is checked for signs of BV.  Treating bacterial vaginosis  BV is often treated with antibiotics. They may be given in oral pill form or as a vaginal cream. To use these medicines:    Be sure to take all of your medicine, even if your symptoms go away.    If you re taking antibiotic pills, do not drink alcohol until you re finished with all of your medicine.    If you re using vaginal cream, apply it as directed. Be aware that the cream may make condoms and diaphragms less effective.    Call your healthcare provider if symptoms do not go away within 4 days of starting treatment. Also call if you have a reaction to the medicine.  Why treatment matters  Even if you have no symptoms or your symptoms are mild, BV should be treated. Untreated BV can lead to health  problems such as:    Increased risk of  delivery if you re pregnant    Increased risk of complications after surgery on the reproductive organs    Possible increased risk of pelvic inflammatory disease (PID)  Date Last Reviewed: 3/1/2017    [id number] [employer]. [address] [place] [address], [place] [address]. All rights reserved. This information is not intended as a substitute for professional medical care. Always follow your healthcare professional's instructions.

## 2020-04-11 NOTE — PROGRESS NOTES
"SUBJECTIVE:  Priscilla Raymond, a 24 year old female, presents for evaluation of dyspareunia and dysuria.  She notes that the painful intercourse has been present for a couple of weeks.  Now some vaginal discharge as well.  She has noticed some odor.      OBJECTIVE:  /61   Pulse 74   Temp 98.2  F (36.8  C) (Oral)   Ht 1.626 m (5' 4\")   Wt 49 kg (108 lb)   LMP 03/11/2020   SpO2 98%   BMI 18.54 kg/m    GENERAL: healthy, alert and no distress  ABDOMEN: soft, nontender, without hepatosplenomegaly or masses and bowel sounds normal  BACK: no CVA tenderness    LAB:  UA RESULTS:  Recent Labs   Lab Test 04/11/20  1718 12/14/19  1827   COLOR Yellow Yellow   APPEARANCE Clear Cloudy   URINEGLC Negative Negative   URINEBILI Negative Negative   URINEKETONE Negative Negative   SG 1.025 1.019   UBLD Negative Moderate*   URINEPH 8.0* 6.0   PROTEIN Negative 100*   UROBILINOGEN 1.0  --    NITRITE Negative Negative   LEUKEST Negative Large*   RBCU  --  59*   WBCU  --  >182*     Wet prep positive for clue cells    ASSESSMENT/PLAN:    ICD-10-CM    1. Bacterial vaginosis  N76.0 metroNIDAZOLE (FLAGYL) 500 MG tablet    B96.89    2. Dysuria  R30.0 *UA reflex to Microscopic and Culture ([facility] (except [place] and [place])   3. Vaginal discharge  N89.8 NEISSERIA GONORRHOEA PCR     CHLAMYDIA TRACHOMATIS PCR     Wet prep       Liu Fisher MD     "

## 2020-04-14 LAB
C TRACH DNA SPEC QL NAA+PROBE: NEGATIVE
N GONORRHOEA DNA SPEC QL NAA+PROBE: NEGATIVE
SPECIMEN SOURCE: NORMAL
SPECIMEN SOURCE: NORMAL

## 2020-11-19 ENCOUNTER — OFFICE VISIT (OUTPATIENT)
Dept: OBGYN | Facility: CLINIC | Age: 25
End: 2020-11-19
Payer: COMMERCIAL

## 2020-11-19 VITALS
WEIGHT: 113.6 LBS | BODY MASS INDEX: 19.39 KG/M2 | HEIGHT: 64 IN | DIASTOLIC BLOOD PRESSURE: 62 MMHG | SYSTOLIC BLOOD PRESSURE: 96 MMHG

## 2020-11-19 DIAGNOSIS — N94.89 VAGINAL BURNING: ICD-10-CM

## 2020-11-19 DIAGNOSIS — R30.0 DYSURIA: Primary | ICD-10-CM

## 2020-11-19 PROBLEM — Z97.5 NEXPLANON IN PLACE: Status: RESOLVED | Noted: 2017-08-29 | Resolved: 2020-11-19

## 2020-11-19 LAB
ALBUMIN UR-MCNC: ABNORMAL MG/DL
APPEARANCE UR: CLEAR
BILIRUB UR QL STRIP: ABNORMAL
COLOR UR AUTO: YELLOW
GLUCOSE UR STRIP-MCNC: NEGATIVE MG/DL
GRAM STN SPEC: ABNORMAL
HGB UR QL STRIP: ABNORMAL
KETONES UR STRIP-MCNC: NEGATIVE MG/DL
LEUKOCYTE ESTERASE UR QL STRIP: ABNORMAL
Lab: ABNORMAL
NITRATE UR QL: NEGATIVE
PH UR STRIP: 5.5 PH (ref 5–7)
SOURCE: ABNORMAL
SP GR UR STRIP: >1.03 (ref 1–1.03)
SPECIMEN SOURCE: ABNORMAL
SPECIMEN SOURCE: ABNORMAL
UROBILINOGEN UR STRIP-ACNC: 0.2 EU/DL (ref 0.2–1)
WET PREP SPEC: ABNORMAL

## 2020-11-19 PROCEDURE — 87102 FUNGUS ISOLATION CULTURE: CPT | Performed by: NURSE PRACTITIONER

## 2020-11-19 PROCEDURE — 87086 URINE CULTURE/COLONY COUNT: CPT | Performed by: NURSE PRACTITIONER

## 2020-11-19 PROCEDURE — 87205 SMEAR GRAM STAIN: CPT | Performed by: NURSE PRACTITIONER

## 2020-11-19 PROCEDURE — 87653 STREP B DNA AMP PROBE: CPT | Performed by: NURSE PRACTITIONER

## 2020-11-19 PROCEDURE — 81003 URINALYSIS AUTO W/O SCOPE: CPT | Performed by: NURSE PRACTITIONER

## 2020-11-19 PROCEDURE — 87210 SMEAR WET MOUNT SALINE/INK: CPT | Performed by: NURSE PRACTITIONER

## 2020-11-19 PROCEDURE — 99213 OFFICE O/P EST LOW 20 MIN: CPT | Performed by: NURSE PRACTITIONER

## 2020-11-19 RX ORDER — CEPHALEXIN 500 MG/1
500 CAPSULE ORAL 2 TIMES DAILY
Qty: 14 CAPSULE | Refills: 0 | Status: SHIPPED | OUTPATIENT
Start: 2020-11-19 | End: 2021-01-11

## 2020-11-19 RX ORDER — NITROFURANTOIN 25; 75 MG/1; MG/1
100 CAPSULE ORAL
COMMUNITY
Start: 2020-11-09 | End: 2021-01-11

## 2020-11-19 RX ORDER — CLINDAMYCIN PHOSPHATE 20 MG/G
1 CREAM VAGINAL
COMMUNITY
Start: 2020-11-09 | End: 2021-01-11

## 2020-11-19 ASSESSMENT — MIFFLIN-ST. JEOR: SCORE: 1250.29

## 2020-11-19 NOTE — PROGRESS NOTES
SUBJECTIVE:                                                   Priscilla Raymond is a 25 year old female who presents to clinic today for the following health issue(s):  Patient presents with:  Vaginal Problem: Stinging and itching, pain during IC. Pt also had some pink discharge and more creamier discharge than normal.  UTI: Dysuria      HPI:  Pt here today with c/o urinary urgency, but little output as well as vaginal stinging and itching.   She's had symptoms for a week or 2.   She took 3 pills of her bactrim at home before going to  about 10 days ago. In the UC she was treated with clindamycin gel as well as macrobid for presumed BV and UTI.    She doesn't feel any better. Her period started today.     Patient's last menstrual period was 2020 (exact date)..     Patient is sexually active, .  Using condoms for contraception.    reports that she has never smoked. She has never used smokeless tobacco.    STD testing offered?  Declined    Health maintenance updated:  yes    Today's PHQ-2 Score:   PHQ-2 (  [employer]) 2017   Q1: Little interest or pleasure in doing things 0   Q2: Feeling down, depressed or hopeless 0   PHQ-2 Score 0     Today's PHQ-9 Score:   PHQ-9 SCORE 2019   PHQ-9 Total Score 5     Today's AGUILA-7 Score:   AGUILA-7 SCORE 2019   Total Score 1       Problem list and histories reviewed & adjusted, as indicated.  Additional history: as documented.    Patient Active Problem List   Diagnosis   (none) - all problems resolved or deleted     Past Surgical History:   Procedure Laterality Date     ------------OTHER-------------      Lipoma removal     AS SURG TX MISSED ABORTN,2ND TRI  2015      Social History     Tobacco Use     Smoking status: Never Smoker     Smokeless tobacco: Never Used   Substance Use Topics     Alcohol use: Yes     Comment: Occas      Problem (# of Occurrences) Relation (Name,Age of Onset)    Breast Cancer (1) Paternal Grandmother    Cervical Cancer (1)  "Mother    Colon Cancer (1) Paternal Grandmother    Heart Disease (1) Maternal Grandmother    Lung Cancer (1) Maternal Grandmother    No Known Problems (5) Father, Sister, Brother, Maternal Grandfather, Other    Osteoporosis (1) Maternal Grandmother            Current Outpatient Medications   Medication Sig     cephALEXin (KEFLEX) 500 MG capsule Take 1 capsule (500 mg) by mouth 2 times daily     nitroFURantoin macrocrystal-monohydrate (MACROBID) 100 MG capsule Take 100 mg by mouth     clindamycin (CLEOCIN) 2 % vaginal cream Place 1 applicator vaginally     sulfamethoxazole-trimethoprim (BACTRIM DS/SEPTRA DS) 800-160 MG tablet Take 1 tablet by mouth once as needed (with intercourse) (Patient not taking: Reported on 1/10/2020)     No current facility-administered medications for this visit.      Allergies   Allergen Reactions     [place]      Mold      Other reaction(s): *Unknown     Seasonal Allergies       Other reactions(s): *Unknown       ROS:  12 point review of systems negative other than symptoms noted below or in the HPI.  Genitourinary: Painful [place], Painful Urination, Urgency, Vaginal Discharge and Vaginal Itching  Normal menstrual cycles, POSITIVE for:, urgency, decreased urinary stream, vaginal discharge, vaginal itching or burning      OBJECTIVE:     BP 96/62   Ht 1.626 m (5' 4\")   Wt 51.5 kg (113 lb 9.6 oz)   LMP 11/19/2020 (Exact Date)   Breastfeeding No   BMI 19.50 kg/m    Body mass index is 19.5 kg/m .    Exam:  Constitutional:  Appearance: Well nourished, well developed alert, in no acute distress  Neurologic:  Mental Status:  Oriented X3.  Normal strength and tone, sensory exam grossly normal, mentation intact and speech normal.    Psychiatric:  Mentation appears normal and affect normal/bright.  Pelvic Exam:  External Genitalia:     Normal appearance for age, no discharge present, no tenderness present, no inflammatory lesions present, color normal  Vagina:     Normal vaginal vault " without central or paravaginal defects, no discharge present, no inflammatory lesions present, no masses present-blood in vault  Bladder:     tender to palpation  Urethra:   Urethral Body:  Urethra palpation TENDER, urethra structural support normal   Urethral Meatus:  No erythema or lesions present  Cervix:     Appearance healthy, no lesions present, nontender to palpation, menses bleeding present  Uterus:     Uterus: firm, normal sized and nontender, anteverted in position.   Adnexa:     No adnexal tenderness present, no adnexal masses present  Perineum:     Perineum within normal limits, no evidence of trauma, no rashes or skin lesions present  Anus:     Anus within normal limits, no hemorrhoids present  Inguinal Lymph Nodes:     No lymphadenopathy present  Pubic Hair:     Normal pubic hair distribution for age  Genitalia and Groin:     No rashes present, no lesions present, no areas of discoloration, no masses present       In-Clinic Test Results:  Results for orders placed or performed in visit on 11/19/20 (from the past 24 hour(s))   UA without Microscopic   Result Value Ref Range    Color Urine Yellow     Appearance Urine Clear     Glucose Urine Negative NEG^Negative mg/dL    Bilirubin Urine Small (A) NEG^Negative    Ketones Urine Negative NEG^Negative mg/dL    Specific Gravity Urine >1.030 1.003 - 1.035    Blood Urine Large (A) NEG^Negative    pH Urine 5.5 5.0 - 7.0 pH    Protein Albumin Urine Trace (A) NEG^Negative mg/dL    Urobilinogen Urine 0.2 0.2 - 1.0 EU/dL    Nitrite Urine Negative NEG^Negative    Leukocyte Esterase Urine Trace (A) NEG^Negative    Source Midstream Urine    Wet prep    Specimen: Vagina   Result Value Ref Range    Specimen Description Vagina     Wet Prep No Trichomonas seen     Wet Prep Rare  Clue cells seen   (A)     Wet Prep No yeast seen     Wet Prep No WBC's seen        ASSESSMENT/PLAN:                                                        ICD-10-CM    1. Dysuria  R30.0 UA without  Microscopic     Urine Culture Aerobic Bacterial     cephALEXin (KEFLEX) 500 MG capsule   2. Vaginal burning  N94.9 Gram stain     Wet prep     Yeast culture     Group B strep PCR       There are no Patient Instructions on file for this visit.    ua +. Will send UC and treat.   Small clue cells on wet prep. Will hopefully flush out with menses. Boric acid discussed. Will use after menses.     BRAYDEN Toure CNP  [facility] [place]

## 2020-11-20 LAB
BACTERIA SPEC CULT: NO GROWTH
GP B STREP DNA SPEC QL NAA+PROBE: NEGATIVE
Lab: NORMAL
SPECIMEN SOURCE: NORMAL
SPECIMEN SOURCE: NORMAL

## 2020-11-23 LAB
Lab: NORMAL
SPECIMEN SOURCE: NORMAL
YEAST SPEC QL CULT: NORMAL

## 2020-11-30 ENCOUNTER — TELEPHONE (OUTPATIENT)
Dept: OBGYN | Facility: CLINIC | Age: 25
End: 2020-11-30

## 2020-11-30 DIAGNOSIS — B37.9 YEAST INFECTION: Primary | ICD-10-CM

## 2020-11-30 RX ORDER — FLUCONAZOLE 150 MG/1
150 TABLET ORAL ONCE
Qty: 1 TABLET | Refills: 0 | Status: SHIPPED | OUTPATIENT
Start: 2020-11-30 | End: 2020-12-03

## 2020-11-30 NOTE — TELEPHONE ENCOUNTER
Patient informed diflucan was sent to pharmacy. no further questions or concerns.,    Cande Lechuga RN on 11/30/2020 at 12:39 PM

## 2020-11-30 NOTE — TELEPHONE ENCOUNTER
Currently on Cipro for a UTI. Just started taking it. Currently has vaginal itching andl discharge is thicker than normal. Has had a history of yeast infections after antibiotic use.   Currently UTI symptoms are improving some.     Routing to Millicent Lowery CNP to review.    Cande Lechuga RN on 11/30/2020 at 11:51 AM

## 2020-12-03 ENCOUNTER — TELEPHONE (OUTPATIENT)
Dept: OBGYN | Facility: CLINIC | Age: 25
End: 2020-12-03

## 2020-12-03 DIAGNOSIS — B37.9 YEAST INFECTION: ICD-10-CM

## 2020-12-03 RX ORDER — FLUCONAZOLE 150 MG/1
150 TABLET ORAL ONCE
Qty: 1 TABLET | Refills: 0 | Status: SHIPPED | OUTPATIENT
Start: 2020-12-03 | End: 2020-12-03

## 2020-12-03 NOTE — TELEPHONE ENCOUNTER
11/19/20 started tx for UTI tx'd with Bactrim DS/Septra DS  Diflucan 1 tab taken 3 days ago for vaginal discharge and itching. No odor.  Sx's persist without relief.    Consulted with JOSELUIS Dotson NP - order for one additional diflucan. If no improvement into next week, needs to be seen in clinic.    Pt advise along with warm plain water tub soaks.  Pt verbalized understanding, in agreement with plan, and voiced no further questions.  Leslie Robles RN on 12/3/2020 at 4:28 PM

## 2021-01-11 ENCOUNTER — OFFICE VISIT (OUTPATIENT)
Dept: URGENT CARE | Facility: URGENT CARE | Age: 26
End: 2021-01-11
Payer: COMMERCIAL

## 2021-01-11 VITALS
OXYGEN SATURATION: 98 % | DIASTOLIC BLOOD PRESSURE: 63 MMHG | HEART RATE: 66 BPM | BODY MASS INDEX: 18.71 KG/M2 | WEIGHT: 109 LBS | SYSTOLIC BLOOD PRESSURE: 99 MMHG | TEMPERATURE: 99.6 F

## 2021-01-11 DIAGNOSIS — R30.0 DYSURIA: Primary | ICD-10-CM

## 2021-01-11 DIAGNOSIS — N30.00 ACUTE CYSTITIS WITHOUT HEMATURIA: ICD-10-CM

## 2021-01-11 PROBLEM — D17.22 LIPOMA OF LEFT SHOULDER: Status: ACTIVE | Noted: 2019-04-03

## 2021-01-11 PROBLEM — R31.9 HEMATURIA: Status: ACTIVE | Noted: 2019-04-03

## 2021-01-11 PROBLEM — N39.0 RECURRENT UTI: Status: ACTIVE | Noted: 2019-04-03

## 2021-01-11 LAB
ALBUMIN UR-MCNC: NEGATIVE MG/DL
APPEARANCE UR: NORMAL
BILIRUB UR QL STRIP: NEGATIVE
COLOR UR AUTO: YELLOW
GLUCOSE UR STRIP-MCNC: NEGATIVE MG/DL
HGB UR QL STRIP: NEGATIVE
KETONES UR STRIP-MCNC: NEGATIVE MG/DL
LEUKOCYTE ESTERASE UR QL STRIP: NEGATIVE
NITRATE UR QL: NEGATIVE
NON-SQ EPI CELLS #/AREA URNS LPF: ABNORMAL /LPF
PH UR STRIP: 7 PH (ref 5–7)
RBC #/AREA URNS AUTO: ABNORMAL /HPF
SOURCE: NORMAL
SP GR UR STRIP: 1.01 (ref 1–1.03)
UROBILINOGEN UR STRIP-ACNC: 0.2 EU/DL (ref 0.2–1)
WBC #/AREA URNS AUTO: ABNORMAL /HPF

## 2021-01-11 PROCEDURE — 87591 N.GONORRHOEAE DNA AMP PROB: CPT | Performed by: PHYSICIAN ASSISTANT

## 2021-01-11 PROCEDURE — 87086 URINE CULTURE/COLONY COUNT: CPT | Performed by: PHYSICIAN ASSISTANT

## 2021-01-11 PROCEDURE — 99213 OFFICE O/P EST LOW 20 MIN: CPT | Performed by: PHYSICIAN ASSISTANT

## 2021-01-11 PROCEDURE — 87491 CHLMYD TRACH DNA AMP PROBE: CPT | Performed by: PHYSICIAN ASSISTANT

## 2021-01-11 PROCEDURE — 81001 URINALYSIS AUTO W/SCOPE: CPT | Performed by: PHYSICIAN ASSISTANT

## 2021-01-11 RX ORDER — NITROFURANTOIN 25; 75 MG/1; MG/1
100 CAPSULE ORAL 2 TIMES DAILY
Qty: 10 CAPSULE | Refills: 0 | Status: SHIPPED | OUTPATIENT
Start: 2021-01-11 | End: 2021-01-16

## 2021-01-11 RX ORDER — INFLUENZA A VIRUS A/NEBRASKA/14/2019 (H1N1) ANTIGEN (MDCK CELL DERIVED, PROPIOLACTONE INACTIVATED), INFLUENZA A VIRUS A/DELAWARE/39/2019 (H3N2) ANTIGEN (MDCK CELL DERIVED, PROPIOLACTONE INACTIVATED), INFLUENZA B VIRUS B/SINGAPORE/INFTT-16-0610/2016 ANTIGEN (MDCK CELL DERIVED, PROPIOLACTONE INACTIVATED), INFLUENZA B VIRUS B/DARWIN/7/2019 ANTIGEN (MDCK CELL DERIVED, PROPIOLACTONE INACTIVATED) 15; 15; 15; 15 UG/.5ML; UG/.5ML; UG/.5ML; UG/.5ML
INJECTION, SUSPENSION INTRAMUSCULAR
COMMUNITY
Start: 2020-10-21 | End: 2022-02-17

## 2021-01-12 NOTE — PROGRESS NOTES
Chief Complaint   Patient presents with     Urgent Care     UTI     frequency       ASSESSMENT/PLAN:  Priscilla was seen today for urgent care and uti.    Diagnoses and all orders for this visit:    Dysuria  -     UA reflex to Microscopic and Culture  -     Urine Microscopic  -     Chlamydia trachomatis PCR  -     Neisseria gonorrhoeae PCR  -     Urine Culture Aerobic Bacterial    Acute cystitis without hematuria  -     nitroFURantoin macrocrystal-monohydrate (MACROBID) 100 MG capsule; Take 1 capsule (100 mg) by mouth 2 times daily for 5 days    Patient's UA was not overtly suspicious for UTI but her symptoms are consistent with her previous infections.  Discussed starting empiric antibiotics today or waiting for the culture to come back and starting them potentially tomorrow if it grows any bacteria.  She would like to start these today.  Discussed that she will be updated with the results of her culture and told to stop the antibiotics if not improving.  If she is improving with antibiotics she can continue taking a full course even if the culture is negative    -Start Macrobid today  -If culture positive continue Macrobid.  If negative stop Macrobid    20 minutes spent on the date of the encounter doing chart review, history and exam, documentation and further activities as noted above    The plan of care was discussed with the patient. They understand and agree with the course of treatment prescribed. A printed summary was given including instructions and medications.    SUBJECTIVE:  Priscilla is a 25 year old female who presents to urgent care with concerns of UTI.  Patient has a history of recurrent urinary tract infections been treated with several antibiotics in the past for this.  She is followed by OB/GYN.  She had 1 week of urinary urgency and incomplete emptying and mild dysuria.  She does have some diffuse lower back pain but think it is from working long shifts.  Otherwise feels well with no significant fever,  chills, nausea or abdominal pain.  Is sexually active with one partner.  Denies any vaginal discharge, erythema or other lesions..    ROS: Pertinent ROS neg other than the symptoms noted above in the HPI.     OBJECTIVE:  BP 99/63   Pulse 66   Temp 99.6  F (37.6  C) (Tympanic)   Wt 49.4 kg (109 lb)   SpO2 98%   BMI 18.71 kg/m     General: Alert, healthy  Abdomen: Soft, no CVA tenderness    DIAGNOSTICS  Results for orders placed or performed in visit on 01/11/21   UA reflex to Microscopic and Culture     Status: None    Specimen: Midstream Urine   Result Value Ref Range    Color Urine Yellow     Appearance Urine Slightly Cloudy     Glucose Urine Negative NEG^Negative mg/dL    Bilirubin Urine Negative NEG^Negative    Ketones Urine Negative NEG^Negative mg/dL    Specific Gravity Urine 1.015 1.003 - 1.035    Blood Urine Negative NEG^Negative    pH Urine 7.0 5.0 - 7.0 pH    Protein Albumin Urine Negative NEG^Negative mg/dL    Urobilinogen Urine 0.2 0.2 - 1.0 EU/dL    Nitrite Urine Negative NEG^Negative    Leukocyte Esterase Urine Negative NEG^Negative    Source Midstream Urine    Urine Microscopic     Status: Abnormal   Result Value Ref Range    WBC Urine 0 - 5 OTO5^0 - 5 /HPF    RBC Urine O - 2 OTO2^O - 2 /HPF    Squamous Epithelial /LPF Urine Many (A) FEW^Few /LPF        Current Outpatient Medications   Medication     clindamycin (CLEOCIN) 2 % vaginal cream     FLUCELVAX QUADRIVALENT 0.5 ML ALLAN     No current facility-administered medications for this visit.       Patient Active Problem List   Diagnosis   (none) - all problems resolved or deleted      Past Medical History:   Diagnosis Date     Chlamydia      Eating disorder, unspecified     Age 16     UTI (urinary tract infection)     frequent     Past Surgical History:   Procedure Laterality Date     ------------OTHER-------------      Lipoma removal     AS SURG TX MISSED ABORTN,2ND TRI  05/2015     Family History   Problem Relation Age of Onset     Cervical  Cancer Mother      Heart Disease Maternal Grandmother      Osteoporosis Maternal Grandmother      Lung Cancer Maternal Grandmother      Breast Cancer Paternal Grandmother      Colon Cancer Paternal Grandmother      No Known Problems Father      No Known Problems Sister      No Known Problems Brother      No Known Problems Maternal Grandfather      No Known Problems Other      Social History     Tobacco Use     Smoking status: Never Smoker     Smokeless tobacco: Never Used   Substance Use Topics     Alcohol use: Yes     Comment: Laurence Delgado PA-C    The use of Dragon/[employer] dictation services may have been used to construct the content in this note; any grammatical or spelling errors are non-intentional. Please contact the author of this note directly if you are in need of any clarification.

## 2021-01-13 LAB
BACTERIA SPEC CULT: NORMAL
C TRACH DNA SPEC QL NAA+PROBE: NEGATIVE
Lab: NORMAL
N GONORRHOEA DNA SPEC QL NAA+PROBE: NEGATIVE
SPECIMEN SOURCE: NORMAL

## 2021-02-06 ENCOUNTER — ANCILLARY PROCEDURE (OUTPATIENT)
Dept: GENERAL RADIOLOGY | Facility: CLINIC | Age: 26
End: 2021-02-06
Attending: FAMILY MEDICINE
Payer: COMMERCIAL

## 2021-02-06 ENCOUNTER — OFFICE VISIT (OUTPATIENT)
Dept: URGENT CARE | Facility: URGENT CARE | Age: 26
End: 2021-02-06
Payer: COMMERCIAL

## 2021-02-06 VITALS
DIASTOLIC BLOOD PRESSURE: 50 MMHG | HEART RATE: 84 BPM | SYSTOLIC BLOOD PRESSURE: 91 MMHG | WEIGHT: 108 LBS | OXYGEN SATURATION: 100 % | BODY MASS INDEX: 18.54 KG/M2 | TEMPERATURE: 98.1 F

## 2021-02-06 DIAGNOSIS — B96.89 BACTERIAL VAGINITIS: ICD-10-CM

## 2021-02-06 DIAGNOSIS — N94.9 VAGINAL SYMPTOM: Primary | ICD-10-CM

## 2021-02-06 DIAGNOSIS — B37.31 CANDIDIASIS OF VAGINA: ICD-10-CM

## 2021-02-06 DIAGNOSIS — N76.0 BACTERIAL VAGINITIS: ICD-10-CM

## 2021-02-06 DIAGNOSIS — R07.89 ATYPICAL CHEST PAIN: ICD-10-CM

## 2021-02-06 LAB
ANION GAP SERPL CALCULATED.3IONS-SCNC: 7 MMOL/L (ref 3–14)
BUN SERPL-MCNC: 9 MG/DL (ref 7–30)
CALCIUM SERPL-MCNC: 9.2 MG/DL (ref 8.5–10.1)
CHLORIDE SERPL-SCNC: 105 MMOL/L (ref 94–109)
CO2 SERPL-SCNC: 28 MMOL/L (ref 20–32)
CREAT SERPL-MCNC: 0.7 MG/DL (ref 0.52–1.04)
ERYTHROCYTE [DISTWIDTH] IN BLOOD BY AUTOMATED COUNT: 13.1 % (ref 10–15)
GFR SERPL CREATININE-BSD FRML MDRD: >90 ML/MIN/{1.73_M2}
GLUCOSE SERPL-MCNC: 81 MG/DL (ref 70–99)
HCT VFR BLD AUTO: 45.7 % (ref 35–47)
HGB BLD-MCNC: 15.1 G/DL (ref 11.7–15.7)
MCH RBC QN AUTO: 31.8 PG (ref 26.5–33)
MCHC RBC AUTO-ENTMCNC: 33 G/DL (ref 31.5–36.5)
MCV RBC AUTO: 96 FL (ref 78–100)
PLATELET # BLD AUTO: 249 10E9/L (ref 150–450)
POTASSIUM SERPL-SCNC: 4.4 MMOL/L (ref 3.4–5.3)
RBC # BLD AUTO: 4.75 10E12/L (ref 3.8–5.2)
SODIUM SERPL-SCNC: 140 MMOL/L (ref 133–144)
SPECIMEN SOURCE: ABNORMAL
TROPONIN I SERPL-MCNC: <0.015 UG/L (ref 0–0.04)
WBC # BLD AUTO: 7.6 10E9/L (ref 4–11)
WET PREP SPEC: ABNORMAL

## 2021-02-06 PROCEDURE — 71046 X-RAY EXAM CHEST 2 VIEWS: CPT | Performed by: RADIOLOGY

## 2021-02-06 PROCEDURE — 99214 OFFICE O/P EST MOD 30 MIN: CPT

## 2021-02-06 PROCEDURE — 87210 SMEAR WET MOUNT SALINE/INK: CPT | Performed by: FAMILY MEDICINE

## 2021-02-06 PROCEDURE — 80048 BASIC METABOLIC PNL TOTAL CA: CPT | Performed by: FAMILY MEDICINE

## 2021-02-06 PROCEDURE — 84484 ASSAY OF TROPONIN QUANT: CPT | Performed by: FAMILY MEDICINE

## 2021-02-06 PROCEDURE — 36415 COLL VENOUS BLD VENIPUNCTURE: CPT | Performed by: FAMILY MEDICINE

## 2021-02-06 PROCEDURE — 85027 COMPLETE CBC AUTOMATED: CPT | Performed by: FAMILY MEDICINE

## 2021-02-06 PROCEDURE — 93000 ELECTROCARDIOGRAM COMPLETE: CPT

## 2021-02-06 RX ORDER — FLUCONAZOLE 150 MG/1
150 TABLET ORAL DAILY
Qty: 3 TABLET | Refills: 0 | Status: SHIPPED | OUTPATIENT
Start: 2021-02-06 | End: 2021-02-09

## 2021-02-06 RX ORDER — METRONIDAZOLE 500 MG/1
500 TABLET ORAL 2 TIMES DAILY
Qty: 14 TABLET | Refills: 0 | Status: SHIPPED | OUTPATIENT
Start: 2021-02-06 | End: 2021-02-13

## 2021-02-06 NOTE — LETTER
February 6, 2021      Priscilla Raymond  [address]  [facility] [address]        Dear ,    We are writing to inform you of your test results.    Lab results came back unremarkable.    Results for orders placed or performed in visit on 02/06/21   XR Chest 2 Views     Status: None    Narrative    CHEST TWO VIEW   2/6/2021 2:20 PM     HISTORY: Atypical chest pain.    COMPARISON: None available.      Impression    IMPRESSION: PA and lateral views of the chest were obtained.  Cardiomediastinal silhouette is within normal limits. No suspicious  focal pulmonary opacities. No significant pleural effusion or  pneumothorax. A few small calcified pulmonary granulomas.    ROSELINE BROWN MD   Results for orders placed or performed in visit on 02/06/21   CBC with platelets     Status: None   Result Value Ref Range    WBC 7.6 4.0 - 11.0 10e9/L    RBC Count 4.75 3.8 - 5.2 10e12/L    Hemoglobin 15.1 11.7 - 15.7 g/dL    Hematocrit 45.7 35.0 - 47.0 %    MCV 96 78 - 100 fl    MCH 31.8 26.5 - 33.0 pg    MCHC 33.0 31.5 - 36.5 g/dL    RDW 13.1 10.0 - 15.0 %    Platelet Count 249 150 - 450 10e9/L   Basic metabolic panel     Status: None   Result Value Ref Range    Sodium 140 133 - 144 mmol/L    Potassium 4.4 3.4 - 5.3 mmol/L    Chloride 105 94 - 109 mmol/L    Carbon Dioxide 28 20 - 32 mmol/L    Anion Gap 7 3 - 14 mmol/L    Glucose 81 70 - 99 mg/dL    Urea Nitrogen 9 7 - 30 mg/dL    Creatinine 0.70 0.52 - 1.04 mg/dL    GFR Estimate >90 >60 mL/min/[1.73_m2]    GFR Estimate If Black >90 >60 mL/min/[1.73_m2]    Calcium 9.2 8.5 - 10.1 mg/dL   Wet prep     Status: Abnormal    Specimen: Swab   Result Value Ref Range    Specimen Description Swab     Wet Prep No Trichomonas seen     Wet Prep Clue cells seen (A)     Wet Prep Yeast seen (A)     Wet Prep Moderate  WBC'S seen          If you have any questions or concerns, please call the clinic at the number listed above.       Sincerely,            Sotero Morales MD

## 2021-02-06 NOTE — PATIENT INSTRUCTIONS
Patient Education     Bacterial Vaginosis    You have a vaginal infection called bacterial vaginosis (BV). Both good and bad bacteria are present in a healthy vagina. BV occurs when these bacteria get out of balance. The number of bad bacteria increase. And the number of good bacteria decrease. BV is linked with sexual activity, but it's not a sexually transmitted infection (STI).   BV may or may not cause symptoms. If symptoms do occur, they can include:     Thin, gray, milky-white, or sometimes green discharge    Unpleasant odor or  fishy  smell    Itching, burning, or pain in or around the vagina  It is not known what causes BV, but certain factors can make the problem more likely. These can include:     Douching    Spermicides    Use of antibiotics    Change in hormone levels with pregnancy, breastfeeding, or menopause    Having sex with a new partner    Having sex with more than one partner  BV will sometimes go away on its own. But treatment is often advised. This is because untreated BV can raise the risk of more serious health problems such as:     Pelvic inflammatory disease (PID)     delivery (giving birth to a baby early if you re pregnant)    HIV and some other sexually transmitted infections (STIs)    Infection after surgery on the reproductive organs  Home care  General care    BV is most often treated with medicines called antibiotics. These may be given as pills or as a vaginal cream. If antibiotics are prescribed, be sure to use them exactly as directed. And complete all of the medicine, even if your symptoms go away.    Don't douche or having sex during treatment.    If you have sex with a female partner, ask your healthcare provider if she should also be treated.  Prevention    Don't douche.    Don't have sex. If you do have sex, then take steps to lower your risk:  ? Use condoms when having sex.  ? Limit the number of sex partners you have.    Follow-up care  Follow up with your  healthcare provider, or as advised.   When to get medical advice  Call your healthcare provider right away if:     You have a fever of 100.4 F (38 C) or higher, or as directed by your provider.    Your symptoms get worse, or they don t go away within a few days of starting treatment.    You have new pain in the lower belly or pelvic region.    You have side effects that bother you or a reaction to the pills or cream you re prescribed.    You or any of your sex partners have new symptoms, such as a rash, joint pain, or sores.  [employer] last reviewed this educational content on 6/1/2020 2000-2020 [employer]. [address] [place] [address]. All rights reserved. This information is not intended as a substitute for professional medical care. Always follow your healthcare professional's instructions.           Patient Education     Vaginal Infection: Yeast (Candidiasis)  Yeast infection occurs when yeast in the vagina increase and attacks the vaginal tissues. Yeast is a type of fungus. These infections are often caused by a type of yeast called Candida albicans. Other species of yeast can also cause infections. Factors that may make infection more likely include recent antibiotic use, douching, or increased sex. Yeast infections are more common in women who have diabetes, or are obese or pregnant, or have a weak immune system.   Symptoms of yeast infection    Clumpy or thin, white discharge, which may look like cottage cheese    No odor or minimal odor    Severe vaginal itching or burning    Burning with urination    Swelling, redness of vulva    Pain during sex    Treating yeast infection  Yeast infection is treated with a vaginal antifungal cream. In some cases, antifungal pills are prescribed instead. During treatment:     Finish all of your medicine, even if your symptoms go away.    Apply the cream before going to bed. Lie flat after applying so that it doesn't drip out.    Don't douche  or use tampons.    Don't rely on a diaphragm or condoms, since the cream may weaken them.    Avoid intercourse if advised by your healthcare provider.  Should I treat a yeast infection myself?  Discuss with your healthcare provider whether you should use over-the-counter medicines to treat a yeast infection. Self-treatment may depend on whether:     You've had a yeast infection in the past.    You're at risk for sexually transmitted infections (STIs).  Call your healthcare provider if symptoms don't go away or come back after treatment.   [employer] last reviewed this educational content on 4/1/2020 2000-2020 [employer]. [address] [place] [address], [place] [address]. All rights reserved. This information is not intended as a substitute for professional medical care. Always follow your healthcare professional's instructions.           Patient Education     Noncardiac Chest Pain    Based on your visit today, the healthcare provider doesn t know what is causing your chest pain. In most cases, people who come to the emergency room with chest pain don t have a problem with their heart. Instead, the pain is caused by other conditions. It's important for the healthcare team to be sure you are not having a life-threatening cause for chest pain such as:     Heart attack    Blood clot in the lungs    Collapsed lung    Ruptured esophagus    Tearing of the aorta  Once these major causes have been ruled out, you may have further evaluation for nonheart causes of chest pain. These may be problems with the lungs, muscles, bones, digestive tract, nerves, or mental health. They include:     Inflammation around the lungs (pleurisy)    Collapsed lung (pneumothorax)    Fluid around the lungs (pleural effusion)    Lung cancer (a rare cause of chest pain)    Inflamed cartilage between the ribs (costochondritis)    Fibromyalgia    Rheumatoid arthritis    Chest wall strain    Reflux    Stomach ulcer    Spasms of the  esophagus    Gall stones    Gallbladder inflammation    Panic or anxiety attacks    Emotional distress  Your condition doesn t seem serious. And your pain doesn t seem to be coming from your heart. But sometimes the signs of a serious problem take more time to appear. Watch for the warning signs listed below.   Home care  Follow these guidelines when caring for yourself at home:     Rest today and don't do any strenuous activity.    Take any prescribed medicine as directed.  Follow-up care  Follow up with your healthcare provider, or as advised, if you don t start to feel better in 24 hours.   Call 911  Call 911 if any of these occur:     A change in the type of pain: if it feels different, becomes more severe, lasts longer, or begins to spread into your shoulder, arm, neck, jaw or back    Shortness of breath or increased pain with breathing    Weakness, dizziness, or fainting    Rapid heart beat    Crushing sensation in your chest  When to seek medical advice  Call your healthcare provider right away if any of these occur:     Cough with dark colored sputum (phlegm) or blood    Fever of 100.4 F (38 C) or higher, or as directed by your healthcare provider    Swelling, pain or redness in one leg  [employer] last reviewed this educational content on 6/1/2019 2000-2020 [employer]. [address] [place] [address], [place] [address]. All rights reserved. This information is not intended as a substitute for professional medical care. Always follow your healthcare professional's instructions.

## 2021-02-06 NOTE — PROGRESS NOTES
Assessment & Plan     Vaginal symptom  Symptoms are likely secondary to vaginal candidiasis and bacterial vaginosis.  Diflucan and metronidazole prescribed, common side effects discussed.  - Wet prep    Atypical chest pain  Symptoms are nonspecific and differentials are broad including musculoskeletal etiology.  Wells score 0.  ECG showed sinus rhythm without any acute ischemic changes or rhythm abnormality.  Chest x-ray came back unremarkable.  CBC and BMP came back normal.  Reassurance provided.  Suggested to continue conservative management and follow-up if symptoms persist or worsen  - CBC with platelets  - XR Chest 2 Views; Future  - Basic metabolic panel  - Troponin I  - EKG 12-lead complete w/read - Clinics    Candidiasis of vagina  - fluconazole (DIFLUCAN) 150 MG tablet; Take 1 tablet (150 mg) by mouth daily for 3 days    Bacterial vaginitis  - metroNIDAZOLE (FLAGYL) 500 MG tablet; Take 1 tablet (500 mg) by mouth 2 times daily for 7 days        30 minutes spent on the date of the encounter doing chart review, review of test results, interpretation of tests, patient visit and documentation            Patient Instructions     Patient Education     Bacterial Vaginosis    You have a vaginal infection called bacterial vaginosis (BV). Both good and bad bacteria are present in a healthy vagina. BV occurs when these bacteria get out of balance. The number of bad bacteria increase. And the number of good bacteria decrease. BV is linked with sexual activity, but it's not a sexually transmitted infection (STI).   BV may or may not cause symptoms. If symptoms do occur, they can include:     Thin, gray, milky-white, or sometimes green discharge    Unpleasant odor or  fishy  smell    Itching, burning, or pain in or around the vagina  It is not known what causes BV, but certain factors can make the problem more likely. These can include:     Douching    Spermicides    Use of antibiotics    Change in hormone levels with  pregnancy, breastfeeding, or menopause    Having sex with a new partner    Having sex with more than one partner  BV will sometimes go away on its own. But treatment is often advised. This is because untreated BV can raise the risk of more serious health problems such as:     Pelvic inflammatory disease (PID)     delivery (giving birth to a baby early if you re pregnant)    HIV and some other sexually transmitted infections (STIs)    Infection after surgery on the reproductive organs  Home care  General care    BV is most often treated with medicines called antibiotics. These may be given as pills or as a vaginal cream. If antibiotics are prescribed, be sure to use them exactly as directed. And complete all of the medicine, even if your symptoms go away.    Don't douche or having sex during treatment.    If you have sex with a female partner, ask your healthcare provider if she should also be treated.  Prevention    Don't douche.    Don't have sex. If you do have sex, then take steps to lower your risk:  ? Use condoms when having sex.  ? Limit the number of sex partners you have.    Follow-up care  Follow up with your healthcare provider, or as advised.   When to get medical advice  Call your healthcare provider right away if:     You have a fever of 100.4 F (38 C) or higher, or as directed by your provider.    Your symptoms get worse, or they don t go away within a few days of starting treatment.    You have new pain in the lower belly or pelvic region.    You have side effects that bother you or a reaction to the pills or cream you re prescribed.    You or any of your sex partners have new symptoms, such as a rash, joint pain, or sores.  [employer] last reviewed this educational content on 2020-2020 [employer]. [address] [place] [id number]. All rights reserved. This information is not intended as a substitute for professional medical care. Always follow your healthcare  professional's instructions.           Patient Education     Vaginal Infection: Yeast (Candidiasis)  Yeast infection occurs when yeast in the vagina increase and attacks the vaginal tissues. Yeast is a type of fungus. These infections are often caused by a type of yeast called Candida albicans. Other species of yeast can also cause infections. Factors that may make infection more likely include recent antibiotic use, douching, or increased sex. Yeast infections are more common in women who have diabetes, or are obese or pregnant, or have a weak immune system.   Symptoms of yeast infection    Clumpy or thin, white discharge, which may look like cottage cheese    No odor or minimal odor    Severe vaginal itching or burning    Burning with urination    Swelling, redness of vulva    Pain during sex    Treating yeast infection  Yeast infection is treated with a vaginal antifungal cream. In some cases, antifungal pills are prescribed instead. During treatment:     Finish all of your medicine, even if your symptoms go away.    Apply the cream before going to bed. Lie flat after applying so that it doesn't drip out.    Don't douche or use tampons.    Don't rely on a diaphragm or condoms, since the cream may weaken them.    Avoid intercourse if advised by your healthcare provider.  Should I treat a yeast infection myself?  Discuss with your healthcare provider whether you should use over-the-counter medicines to treat a yeast infection. Self-treatment may depend on whether:     You've had a yeast infection in the past.    You're at risk for sexually transmitted infections (STIs).  Call your healthcare provider if symptoms don't go away or come back after treatment.   [employer] last reviewed this educational content on 4/1/2020 2000-2020 [employer]. [address] [place] [address], [place] [address]. All rights reserved. This information is not intended as a substitute for professional medical care. Always follow your  healthcare professional's instructions.           Patient Education     Noncardiac Chest Pain    Based on your visit today, the healthcare provider doesn t know what is causing your chest pain. In most cases, people who come to the emergency room with chest pain don t have a problem with their heart. Instead, the pain is caused by other conditions. It's important for the healthcare team to be sure you are not having a life-threatening cause for chest pain such as:     Heart attack    Blood clot in the lungs    Collapsed lung    Ruptured esophagus    Tearing of the aorta  Once these major causes have been ruled out, you may have further evaluation for nonheart causes of chest pain. These may be problems with the lungs, muscles, bones, digestive tract, nerves, or mental health. They include:     Inflammation around the lungs (pleurisy)    Collapsed lung (pneumothorax)    Fluid around the lungs (pleural effusion)    Lung cancer (a rare cause of chest pain)    Inflamed cartilage between the ribs (costochondritis)    Fibromyalgia    Rheumatoid arthritis    Chest wall strain    Reflux    Stomach ulcer    Spasms of the esophagus    Gall stones    Gallbladder inflammation    Panic or anxiety attacks    Emotional distress  Your condition doesn t seem serious. And your pain doesn t seem to be coming from your heart. But sometimes the signs of a serious problem take more time to appear. Watch for the warning signs listed below.   Home care  Follow these guidelines when caring for yourself at home:     Rest today and don't do any strenuous activity.    Take any prescribed medicine as directed.  Follow-up care  Follow up with your healthcare provider, or as advised, if you don t start to feel better in 24 hours.   Call 911  Call 911 if any of these occur:     A change in the type of pain: if it feels different, becomes more severe, lasts longer, or begins to spread into your shoulder, arm, neck, jaw or back    Shortness of  breath or increased pain with breathing    Weakness, dizziness, or fainting    Rapid heart beat    Crushing sensation in your chest  When to seek medical advice  Call your healthcare provider right away if any of these occur:     Cough with dark colored sputum (phlegm) or blood    Fever of 100.4 F (38 C) or higher, or as directed by your healthcare provider    Swelling, pain or redness in one leg  [employer] last reviewed this educational content on 6/1/2019 2000-2020 [employer]. [address] [place] [address]. All rights reserved. This information is not intended as a substitute for professional medical care. Always follow your healthcare professional's instructions.                 [facility] Urgent Care Provider  [facility] ILENE Wells is a 25 year old who presents to clinic today for the following health issues     HPI       Concern -   Onset: 2 days   Description: vaginal discharge, itching   Intensity: moderate  Progression of Symptoms:  same  Accompanying Signs & Symptoms: ongoing intermittent chest pain for 4 weeks, no fever, chills, sob, cough, palpitation or other relevant systemic symptoms   Previous history of similar problem: BV, bacterial vaginosis   Therapies tried and outcome: None      Review of Systems   Constitutional, HEENT, cardiovascular, pulmonary, gi and gu systems are negative, except as otherwise noted.      Objective    BP 91/50   Pulse 84   Temp 98.1  F (36.7  C) (Tympanic)   Wt 49 kg (108 lb)   SpO2 100%   BMI 18.54 kg/m    Body mass index is 18.54 kg/m .  Physical Exam   GENERAL: alert and no distress  EYES: Eyes grossly normal to inspection, PERRL and conjunctivae and sclerae normal  NECK: no adenopathy, no asymmetry, masses, or scars and thyroid normal to palpation  RESP: lungs clear to auscultation - no rales, rhonchi or wheezes  CV: regular rate and rhythm, normal S1 S2, no S3 or S4, no murmur, click or rub, no peripheral  edema and peripheral pulses strong  ABDOMEN: soft, nontender, no hepatosplenomegaly, no masses and bowel sounds normal  MS: no gross musculoskeletal defects noted, no edema  NEURO: Normal strength and tone, mentation intact and speech normal  PSYCH: mentation appears normal, affect normal/bright    Results for orders placed or performed in visit on 02/06/21   XR Chest 2 Views     Status: None    Narrative    CHEST TWO VIEW   2/6/2021 2:20 PM     HISTORY: Atypical chest pain.    COMPARISON: None available.      Impression    IMPRESSION: PA and lateral views of the chest were obtained.  Cardiomediastinal silhouette is within normal limits. No suspicious  focal pulmonary opacities. No significant pleural effusion or  pneumothorax. A few small calcified pulmonary granulomas.    ROSELINE BROWN MD   Results for orders placed or performed in visit on 02/06/21   CBC with platelets     Status: None   Result Value Ref Range    WBC 7.6 4.0 - 11.0 10e9/L    RBC Count 4.75 3.8 - 5.2 10e12/L    Hemoglobin 15.1 11.7 - 15.7 g/dL    Hematocrit 45.7 35.0 - 47.0 %    MCV 96 78 - 100 fl    MCH 31.8 26.5 - 33.0 pg    MCHC 33.0 31.5 - 36.5 g/dL    RDW 13.1 10.0 - 15.0 %    Platelet Count 249 150 - 450 10e9/L   Basic metabolic panel     Status: None   Result Value Ref Range    Sodium 140 133 - 144 mmol/L    Potassium 4.4 3.4 - 5.3 mmol/L    Chloride 105 94 - 109 mmol/L    Carbon Dioxide 28 20 - 32 mmol/L    Anion Gap 7 3 - 14 mmol/L    Glucose 81 70 - 99 mg/dL    Urea Nitrogen 9 7 - 30 mg/dL    Creatinine 0.70 0.52 - 1.04 mg/dL    GFR Estimate >90 >60 mL/min/[1.73_m2]    GFR Estimate If Black >90 >60 mL/min/[1.73_m2]    Calcium 9.2 8.5 - 10.1 mg/dL   Wet prep     Status: Abnormal    Specimen: Swab   Result Value Ref Range    Specimen Description Swab     Wet Prep No Trichomonas seen     Wet Prep Clue cells seen (A)     Wet Prep Yeast seen (A)     Wet Prep Moderate  WBC'S seen          ----- Ambulatory Services Attestations for Billing  on Time -----

## 2021-02-19 DIAGNOSIS — B37.31 CANDIDIASIS OF VAGINA: ICD-10-CM

## 2021-02-22 ENCOUNTER — OFFICE VISIT (OUTPATIENT)
Dept: URGENT CARE | Facility: URGENT CARE | Age: 26
End: 2021-02-22
Payer: COMMERCIAL

## 2021-02-22 VITALS
SYSTOLIC BLOOD PRESSURE: 105 MMHG | BODY MASS INDEX: 18.37 KG/M2 | OXYGEN SATURATION: 97 % | WEIGHT: 107 LBS | TEMPERATURE: 97.6 F | DIASTOLIC BLOOD PRESSURE: 71 MMHG | HEART RATE: 78 BPM

## 2021-02-22 DIAGNOSIS — B96.89 BACTERIAL VAGINOSIS: ICD-10-CM

## 2021-02-22 DIAGNOSIS — N76.0 BACTERIAL VAGINOSIS: ICD-10-CM

## 2021-02-22 DIAGNOSIS — N89.8 VAGINAL DISCHARGE: Primary | ICD-10-CM

## 2021-02-22 DIAGNOSIS — B37.31 CANDIDIASIS OF VAGINA: ICD-10-CM

## 2021-02-22 LAB
ALBUMIN UR-MCNC: NEGATIVE MG/DL
APPEARANCE UR: ABNORMAL
BACTERIA #/AREA URNS HPF: ABNORMAL /HPF
BILIRUB UR QL STRIP: NEGATIVE
COLOR UR AUTO: YELLOW
GLUCOSE UR STRIP-MCNC: NEGATIVE MG/DL
HGB UR QL STRIP: ABNORMAL
KETONES UR STRIP-MCNC: NEGATIVE MG/DL
LEUKOCYTE ESTERASE UR QL STRIP: ABNORMAL
NITRATE UR QL: NEGATIVE
NON-SQ EPI CELLS #/AREA URNS LPF: ABNORMAL /LPF
PH UR STRIP: 6 PH (ref 5–7)
RBC #/AREA URNS AUTO: ABNORMAL /HPF
SOURCE: ABNORMAL
SP GR UR STRIP: 1.02 (ref 1–1.03)
SPECIMEN SOURCE: ABNORMAL
UROBILINOGEN UR STRIP-ACNC: 0.2 EU/DL (ref 0.2–1)
WBC #/AREA URNS AUTO: ABNORMAL /HPF
WET PREP SPEC: ABNORMAL

## 2021-02-22 PROCEDURE — 99214 OFFICE O/P EST MOD 30 MIN: CPT | Performed by: PHYSICIAN ASSISTANT

## 2021-02-22 PROCEDURE — 87086 URINE CULTURE/COLONY COUNT: CPT | Performed by: PHYSICIAN ASSISTANT

## 2021-02-22 PROCEDURE — 81001 URINALYSIS AUTO W/SCOPE: CPT | Performed by: PHYSICIAN ASSISTANT

## 2021-02-22 PROCEDURE — 87210 SMEAR WET MOUNT SALINE/INK: CPT | Performed by: PHYSICIAN ASSISTANT

## 2021-02-22 PROCEDURE — 87088 URINE BACTERIA CULTURE: CPT | Performed by: PHYSICIAN ASSISTANT

## 2021-02-22 RX ORDER — METRONIDAZOLE 500 MG/1
500 TABLET ORAL 2 TIMES DAILY
Qty: 14 TABLET | Refills: 0 | Status: SHIPPED | OUTPATIENT
Start: 2021-02-22 | End: 2021-03-01

## 2021-02-22 RX ORDER — FLUCONAZOLE 150 MG/1
150 TABLET ORAL DAILY
Qty: 2 TABLET | Refills: 1 | OUTPATIENT
Start: 2021-02-22 | End: 2021-02-25

## 2021-02-22 RX ORDER — FLUCONAZOLE 150 MG/1
150 TABLET ORAL DAILY
Qty: 7 TABLET | Refills: 0 | Status: SHIPPED | OUTPATIENT
Start: 2021-02-22 | End: 2021-03-01

## 2021-02-23 NOTE — PROGRESS NOTES
Chief Complaint   Patient presents with     Urgent Care     Vaginal Problem     x 2 weeks. was in urgent care on 2/6/21 has taken all medication with no relief        ASSESSMENT/PLAN:  Priscilla was seen today for urgent care and vaginal problem.    Diagnoses and all orders for this visit:    Vaginal discharge  -     Wet prep  -     *UA reflex to Microscopic and Culture ([place] and [facility] (except [place] and [place])  -     Urine Microscopic    Bacterial vaginosis  -     metroNIDAZOLE (FLAGYL) 500 MG tablet; Take 1 tablet (500 mg) by mouth 2 times daily for 7 days    Candidiasis of vagina  -     fluconazole (DIFLUCAN) 150 MG tablet; Take 1 tablet (150 mg) by mouth daily for 7 days    Other orders  -     Urine Culture Aerobic Bacterial    She decision-making was used during this visit.  Discussed wet prep shows clue cells however patient feels her yeast infection never resolved and has a history of stubborn yeast infections that require prolonged treatment.  We will start treatment with oral Flagyl for the next few days.  If she is having improvements she will continue taking this.  If she notices she is not improving or clumpy white discharge/other signs of yeast infection she can start the Diflucan.  She routinely needs 7 days of Diflucan to resolve her yeast infections.  Urine culture will be sent.  If this comes back positive we will start her on an antibiotic for UTI.    30 minutes spent on the date of the encounter doing chart review, history and exam, documentation and further activities as noted above    The plan of care was discussed with the patient. They understand and agree with the course of treatment prescribed. A printed summary was given including instructions and medications.    SUBJECTIVE:  Priscilla is a 25 year old female who presents to urgent care with continued vaginal irritation, discharge and pain.  She was seen 2 weeks ago and treated for BV and yeast infection.  She feels like her  symptoms mildly improved but thinks she still has a continued yeast infection.  She is having whitish discharge that is now pinkish.  No abdominal pain fevers, back pain or other systemic symptoms.  She was tested for STDs at last visit    ROS: Pertinent ROS neg other than the symptoms noted above in the HPI.     OBJECTIVE:  /71 (BP Location: Right arm, Patient Position: Sitting, Cuff Size: Adult Regular)   Pulse 78   Temp 97.6  F (36.4  C) (Tympanic)   Wt 48.5 kg (107 lb)   SpO2 97%   Breastfeeding No   BMI 18.37 kg/m     GENERAL: healthy, alert and no distress    DIAGNOSTICS    No results found for any visits on 02/22/21.     Current Outpatient Medications   Medication     FLUCELVAX QUADRIVALENT 0.5 ML ALLAN     No current facility-administered medications for this visit.       Patient Active Problem List   Diagnosis     Absence of menstruation     Eating disorder     Hematuria     Lipoma of left shoulder     Peripheral vascular disease (H)     Recurrent UTI     Symptoms involving cardiovascular system      Past Medical History:   Diagnosis Date     Chlamydia      Eating disorder, unspecified     Age 16     UTI (urinary tract infection)     frequent     Past Surgical History:   Procedure Laterality Date     ------------OTHER-------------      Lipoma removal     AS SURG TX MISSED ABORTN,2ND TRI  05/2015     Family History   Problem Relation Age of Onset     Cervical Cancer Mother      Heart Disease Maternal Grandmother      Osteoporosis Maternal Grandmother      Lung Cancer Maternal Grandmother      Breast Cancer Paternal Grandmother      Colon Cancer Paternal Grandmother      No Known Problems Father      No Known Problems Sister      No Known Problems Brother      No Known Problems Maternal Grandfather      No Known Problems Other      Social History     Tobacco Use     Smoking status: Never Smoker     Smokeless tobacco: Never Used   Substance Use Topics     Alcohol use: Yes     Comment: Occas         Patient was seen in conjunction with Leann Carmichael, NP Student.      Lester Delgado PA-C    The use of Dragon/[employer] dictation services may have been used to construct the content in this note; any grammatical or spelling errors are non-intentional. Please contact the author of this note directly if you are in need of any clarification.

## 2021-02-24 ENCOUNTER — TELEPHONE (OUTPATIENT)
Dept: URGENT CARE | Facility: URGENT CARE | Age: 26
End: 2021-02-24
Payer: COMMERCIAL

## 2021-02-24 DIAGNOSIS — N30.00 ACUTE CYSTITIS WITHOUT HEMATURIA: Primary | ICD-10-CM

## 2021-02-24 LAB
BACTERIA SPEC CULT: ABNORMAL
BACTERIA SPEC CULT: ABNORMAL
Lab: ABNORMAL
SPECIMEN SOURCE: ABNORMAL

## 2021-02-24 PROCEDURE — 99207 PR NO CHARGE LOS: CPT | Performed by: NURSE PRACTITIONER

## 2021-02-24 RX ORDER — CEPHALEXIN 500 MG/1
500 CAPSULE ORAL 2 TIMES DAILY
Qty: 14 CAPSULE | Refills: 0 | Status: SHIPPED | OUTPATIENT
Start: 2021-02-24 | End: 2021-03-03

## 2021-02-24 NOTE — TELEPHONE ENCOUNTER
Patient called and placed on Keflex BID for 7 days as a result of her urine culture that was done on 2/22/2021 in Urgent Care.

## 2021-03-11 ENCOUNTER — OFFICE VISIT (OUTPATIENT)
Dept: URGENT CARE | Facility: URGENT CARE | Age: 26
End: 2021-03-11
Payer: COMMERCIAL

## 2021-03-11 VITALS
OXYGEN SATURATION: 100 % | HEART RATE: 69 BPM | TEMPERATURE: 97.8 F | DIASTOLIC BLOOD PRESSURE: 68 MMHG | WEIGHT: 106 LBS | BODY MASS INDEX: 18.19 KG/M2 | SYSTOLIC BLOOD PRESSURE: 99 MMHG

## 2021-03-11 DIAGNOSIS — N76.0 BV (BACTERIAL VAGINOSIS): ICD-10-CM

## 2021-03-11 DIAGNOSIS — N89.8 VAGINAL IRRITATION: Primary | ICD-10-CM

## 2021-03-11 DIAGNOSIS — B96.89 BV (BACTERIAL VAGINOSIS): ICD-10-CM

## 2021-03-11 LAB
ALBUMIN UR-MCNC: NEGATIVE MG/DL
APPEARANCE UR: CLEAR
BACTERIA #/AREA URNS HPF: ABNORMAL /HPF
BILIRUB UR QL STRIP: NEGATIVE
COLOR UR AUTO: YELLOW
GLUCOSE UR STRIP-MCNC: NEGATIVE MG/DL
HGB UR QL STRIP: NEGATIVE
KETONES UR STRIP-MCNC: NEGATIVE MG/DL
LEUKOCYTE ESTERASE UR QL STRIP: ABNORMAL
Lab: ABNORMAL
NITRATE UR QL: NEGATIVE
NON-SQ EPI CELLS #/AREA URNS LPF: ABNORMAL /LPF
PH UR STRIP: 7 PH (ref 5–7)
RBC #/AREA URNS AUTO: ABNORMAL /HPF
SOURCE: ABNORMAL
SP GR UR STRIP: >1.03 (ref 1–1.03)
SPECIMEN SOURCE: ABNORMAL
UROBILINOGEN UR STRIP-ACNC: 0.2 EU/DL (ref 0.2–1)
WBC #/AREA URNS AUTO: ABNORMAL /HPF
WET PREP SPEC: ABNORMAL

## 2021-03-11 PROCEDURE — 87491 CHLMYD TRACH DNA AMP PROBE: CPT | Performed by: NURSE PRACTITIONER

## 2021-03-11 PROCEDURE — 99213 OFFICE O/P EST LOW 20 MIN: CPT | Performed by: NURSE PRACTITIONER

## 2021-03-11 PROCEDURE — 87210 SMEAR WET MOUNT SALINE/INK: CPT | Performed by: NURSE PRACTITIONER

## 2021-03-11 PROCEDURE — 87591 N.GONORRHOEAE DNA AMP PROB: CPT | Performed by: NURSE PRACTITIONER

## 2021-03-11 PROCEDURE — 81001 URINALYSIS AUTO W/SCOPE: CPT | Performed by: NURSE PRACTITIONER

## 2021-03-11 NOTE — PROGRESS NOTES
Assessment & Plan     Vaginal irritation    - Wet prep  - Chlamydia trachomatis PCR  - Neisseria gonorrhoeae PCR  - *UA reflex to Microscopic and Culture ([place] and [facility] (except [place] and [place])  - Urine Microscopic    BV (bacterial vaginosis)    - clindamycin (CLEOCIN) 100 MG vaginal suppository; Place 1 suppository (100 mg) vaginally At Bedtime for 7 days    Start drinking Shen daily for the next 7 days  If symptoms do not improved in a week, please see GYN for re-evlauation    20 minutes spent on the date of the encounter doing chart review, interpretation of tests, patient visit and documentation          No follow-ups on file.    Jennifer Santos, CNP  [facility] ILENE Wells is a 25 year old who presents for the following health issues     HPI       Vaginal Symptoms  Onset/Duration: 1 month  Description:  Vaginal Discharge: white curd-like   Itching (Pruritis): no  Burning sensation:  YES  Odor: no  Accompanying Signs & Symptoms:  Urinary symptoms: YES  Abdominal pain: no  Fever: no  History:   Possibility of Pregnancy:  no  Recent antibiotic use: YES- Keflex for UC positive for strep in February 2021, Had UTI treated in January 2021 as well.   Previous vaginitis issues: YES- Had BV and yeast in February.   Precipitating or alleviating factors: None  Therapies tried and outcome: Diflucan      Review of Systems   Constitutional, HEENT, cardiovascular, pulmonary, gi and gu systems are negative, except as otherwise noted.      Objective    BP 99/68 (BP Location: Right arm, Patient Position: Sitting, Cuff Size: Adult Regular)   Pulse 69   Temp 97.8  F (36.6  C) (Oral)   Wt 48.1 kg (106 lb)   SpO2 100%   Breastfeeding No   BMI 18.19 kg/m    Body mass index is 18.19 kg/m .  Physical Exam   GENERAL: healthy, alert and no distress  NECK: no adenopathy, no asymmetry, masses, or scars and thyroid normal to palpation  RESP: lungs clear to auscultation -  no rales, rhonchi or wheezes  CV: regular rate and rhythm, normal S1 S2, no S3 or S4, no murmur, click or rub, no peripheral edema and peripheral pulses strong  ABDOMEN: soft, nontender, no hepatosplenomegaly, no masses and bowel sounds normal    Results for orders placed or performed in visit on 03/11/21 (from the past 24 hour(s))   Wet prep    Specimen: Vagina   Result Value Ref Range    Specimen Description Vagina     Special Requests Vagina     Wet Prep No Trichomonas seen  No yeast seen       Wet Prep Clue cells seen  Many   (A)     Wet Prep WBC'S seen  Few      *UA reflex to Microscopic and Culture ([place] and [facility] (except [place] and [place])    Specimen: Midstream Urine   Result Value Ref Range    Color Urine Yellow     Appearance Urine Clear     Glucose Urine Negative NEG^Negative mg/dL    Bilirubin Urine Negative NEG^Negative    Ketones Urine Negative NEG^Negative mg/dL    Specific Gravity Urine >1.030 1.003 - 1.035    Blood Urine Negative NEG^Negative    pH Urine 7.0 5.0 - 7.0 pH    Protein Albumin Urine Negative NEG^Negative mg/dL    Urobilinogen Urine 0.2 0.2 - 1.0 EU/dL    Nitrite Urine Negative NEG^Negative    Leukocyte Esterase Urine Trace (A) NEG^Negative    Source Midstream Urine    Urine Microscopic   Result Value Ref Range    WBC Urine 0 - 5 OTO5^0 - 5 /HPF    RBC Urine O - 2 OTO2^O - 2 /HPF    Squamous Epithelial /LPF Urine Many (A) FEW^Few /LPF    Bacteria Urine Few (A) NEG^Negative /HPF

## 2021-04-06 ENCOUNTER — E-VISIT (OUTPATIENT)
Dept: URGENT CARE | Facility: URGENT CARE | Age: 26
End: 2021-04-06
Payer: COMMERCIAL

## 2021-04-06 DIAGNOSIS — Z20.822 CLOSE EXPOSURE TO 2019 NOVEL CORONAVIRUS: Primary | ICD-10-CM

## 2021-04-06 PROCEDURE — 99421 OL DIG E/M SVC 5-10 MIN: CPT | Performed by: PHYSICIAN ASSISTANT

## 2021-04-06 NOTE — PATIENT INSTRUCTIONS
"  Dear Priscilla Raymond,    We would like to test you for this virus prior to travel. I have placed an order for this test.     How to schedule:  For all employees or close contacts (except [place] and [place] - see below), go to your [employer] home page and scroll down to the section that says  You have an appointment that needs to be scheduled  and click the large green button that says  Schedule Now  and follow the steps to find the next available opening.     If you are unable to complete these steps or if you cannot find any available times, please call [phone] to schedule employee testing.     [place] employees or close contacts, please call [phone].   [place] ([employer]) employees or close contacts call [phone].    Return to work/school/ guidance:   For people with high risk exposures outside the home    Please let your workplace manager and staffing office know when your quarantine ends.     We can not give you an exact date as it depends on the information below. You can calculate this on your own or work with your manager/staffing office to calculate this. (For example if you were exposed on 10/4, you would have to quarantine for 14 full days. That would be through 10/18. You could return on 10/19.)    Quarantine Guidelines:  Patients (\"contacts\") who have been in close prolonged contact of an infected person(s) (within six feet for at least 15 minutes within a 24 hour period), and remain asymptomatic should enter quarantine based on the following options:    14-day quarantine period (this remains the CDC recommendation for the greatest protection against spread of COVID-19) OR    Minimum 7-day quarantine with negative RT-PCR test collected on day 5 or later OR    10-day quarantine with no test  Quarantine Guideline exceptions are as follows:    People who have been fully vaccinated do not need to quarantine if the exposure was at least 2 weeks after the last vaccination. This " includes vaccinated health care workers.    Not fully vaccinated and unvaccinated Individuals who work in health care, congregate care, or congregate living should be off work for 14 days from their last date of exposure. Community activities for this group can be resumed based on options above. Fully vaccinated individuals in this group do not need to quarantine from work after exposure.    Not fully vaccinated and unvaccinated people whose high-risk exposure was a household member should always quarantine for 14 days from their last date of exposure. Fully vaccinated people in this category do not need to quarantine.    Not fully vaccinated or unvaccinated residents of congregate care and congregate living settings should always quarantine for 14 days from their last date of exposure. Fully vaccinated residents do not need to quarantine.    Note: If you have ongoing exposure to the covid positive person, this quarantine period may be more than 14 days. (For example, if you are continued to be exposed to your child who tested positive and cannot isolate from them, then the quarantine of 7-14 days can't start until your child is no longer contagious. This is typically 10 days from onset of the child's symptoms. So the total duration may be 17-24 days in this case.)    You should continue symptom monitoring until day 14 post-exposure. If you develop signs or symptoms of COVID-19, isolate and get tested (even if you have been tested already).    How to quarantine:   Stay home and away from others. Don't go to school or anywhere else. Generally quarantine means staying home from work but there are some exceptions to this. Please contact your workplace.  No hugging, kissing or shaking hands.  Don't let anyone visit.  Cover your mouth and nose with a mask, tissue or washcloth to avoid spreading germs.  Wash your hands and face often. Use soap and water.    What are the symptoms of COVID-19?  The most common symptoms are  cough, fever and trouble breathing. Less common symptoms include headache, body aches, fatigue (feeling very tired), chills, sore throat, stuffy or runny nose, diarrhea (loose poop), loss of taste or smell, belly pain, and nausea or vomiting (feeling sick to your stomach or throwing up).  After 14 days, if you have still don't have symptoms, you likely don't have this virus.  If you develop symptoms, follow these guidelines.  If you're normally healthy: Please start another eVisit.  If you have a serious health problem (like cancer, heart failure, an organ transplant or kidney disease): Call your specialty clinic. Let them know that you might have COVID-19.    Where can I get more information?  [facility] - About COVID-19: [email or web address]  CDC - What to Do If You're Sick: www.cdc.gov/coronavirus/2019-ncov/about/steps-when-sick.html  CDC - Ending Home Isolation: www.cdc.gov/coronavirus/2019-ncov/hcp/disposition-in-home-patients.html  CDC - Caring for Someone: www.cdc.gov/coronavirus/2019-ncov/if-you-are-sick/care-for-someone.html  [facility] clinical trials (COVID-19 research studies): clinicalaffairs.[facility]/n-clinical-trials  Below are the COVID-19 hotlines at the [facility] of [facility] ([facility]). Interpreters are available.  For health questions: Call 713-704-0137 or 1-256.393.7927 (7 a.m. to 7 p.m.)  For questions about schools and childcare: Call 713-962-7058 or 1-854.837.3019 (7 a.m. to 7 p.m.)

## 2021-04-09 DIAGNOSIS — Z20.822 CLOSE EXPOSURE TO 2019 NOVEL CORONAVIRUS: ICD-10-CM

## 2021-04-09 PROCEDURE — U0005 INFEC AGEN DETEC AMPLI PROBE: HCPCS | Performed by: PHYSICIAN ASSISTANT

## 2021-04-09 PROCEDURE — U0003 INFECTIOUS AGENT DETECTION BY NUCLEIC ACID (DNA OR RNA); SEVERE ACUTE RESPIRATORY SYNDROME CORONAVIRUS 2 (SARS-COV-2) (CORONAVIRUS DISEASE [COVID-19]), AMPLIFIED PROBE TECHNIQUE, MAKING USE OF HIGH THROUGHPUT TECHNOLOGIES AS DESCRIBED BY CMS-2020-01-R: HCPCS | Performed by: PHYSICIAN ASSISTANT

## 2021-04-10 LAB
LABORATORY COMMENT REPORT: NORMAL
SARS-COV-2 RNA RESP QL NAA+PROBE: NEGATIVE
SARS-COV-2 RNA RESP QL NAA+PROBE: NORMAL
SPECIMEN SOURCE: NORMAL
SPECIMEN SOURCE: NORMAL

## 2021-04-18 ENCOUNTER — HEALTH MAINTENANCE LETTER (OUTPATIENT)
Age: 26
End: 2021-04-18

## 2021-05-12 ENCOUNTER — OFFICE VISIT (OUTPATIENT)
Dept: URGENT CARE | Facility: URGENT CARE | Age: 26
End: 2021-05-12
Payer: COMMERCIAL

## 2021-05-12 VITALS
SYSTOLIC BLOOD PRESSURE: 97 MMHG | WEIGHT: 104 LBS | OXYGEN SATURATION: 100 % | DIASTOLIC BLOOD PRESSURE: 60 MMHG | BODY MASS INDEX: 17.85 KG/M2 | TEMPERATURE: 96.4 F | HEART RATE: 69 BPM

## 2021-05-12 DIAGNOSIS — N76.0 BV (BACTERIAL VAGINOSIS): ICD-10-CM

## 2021-05-12 DIAGNOSIS — B96.89 BV (BACTERIAL VAGINOSIS): ICD-10-CM

## 2021-05-12 DIAGNOSIS — N89.8 VAGINAL DISCHARGE: Primary | ICD-10-CM

## 2021-05-12 LAB
SPECIMEN SOURCE: ABNORMAL
WET PREP SPEC: ABNORMAL
WET PREP SPEC: ABNORMAL

## 2021-05-12 PROCEDURE — 87210 SMEAR WET MOUNT SALINE/INK: CPT | Performed by: NURSE PRACTITIONER

## 2021-05-12 PROCEDURE — 99213 OFFICE O/P EST LOW 20 MIN: CPT | Performed by: NURSE PRACTITIONER

## 2021-05-12 RX ORDER — METRONIDAZOLE 500 MG/1
500 TABLET ORAL 2 TIMES DAILY
Qty: 14 TABLET | Refills: 0 | Status: SHIPPED | OUTPATIENT
Start: 2021-05-12 | End: 2021-05-19

## 2021-05-12 NOTE — PROGRESS NOTES
Assessment & Plan     Vaginal discharge    - Wet prep    BV (bacterial vaginosis)    - metroNIDAZOLE (FLAGYL) 500 MG tablet; Take 1 tablet (500 mg) by mouth 2 times daily for 7 days      15 minutes spent on the date of the encounter doing chart review, review of test results, patient visit and documentation        See Patient Instructions    No follow-ups on file.    Jennifer Santos, CNP  [facility] URGENT CARE ILENE Wells is a 25 year old who presents for the following health issues [id number]    HPI     Vaginal Symptoms  Onset/Duration: 3 days  Description:  Vaginal Discharge: white   Itching (Pruritis): no  Burning sensation:  YES  Odor: no  Accompanying Signs & Symptoms:  Urinary symptoms: no  Abdominal pain: no  Fever: no  History:   Sexually active: YES  New Partner: no  Possibility of Pregnancy:  no  Recent antibiotic use: no  Previous vaginitis issues: YES  Precipitating or alleviating factors: None  Therapies tried and outcome: none        Review of Systems   Constitutional, HEENT, cardiovascular, pulmonary, gi and gu systems are negative, except as otherwise noted.      Objective    BP 97/60 (BP Location: Left arm, Patient Position: Sitting, Cuff Size: Adult Regular)   Pulse 69   Temp 96.4  F (35.8  C) (Tympanic)   Wt 47.2 kg (104 lb)   SpO2 100%   Breastfeeding No   BMI 17.85 kg/m    Body mass index is 17.85 kg/m .  Physical Exam   GENERAL: healthy, alert and no distress  NECK: no adenopathy, no asymmetry, masses, or scars and thyroid normal to palpation  RESP: lungs clear to auscultation - no rales, rhonchi or wheezes  CV: regular rate and rhythm, normal S1 S2, no S3 or S4, no murmur, click or rub, no peripheral edema and peripheral pulses strong  ABDOMEN: soft, nontender, no hepatosplenomegaly, no masses and bowel sounds normal. No CVA tenderness  MS: no gross musculoskeletal defects noted, no edema    Results for orders placed or performed in visit on 05/12/21 (from the  past 24 hour(s))   Wet prep    Specimen: Vagina   Result Value Ref [place]    Specimen Description Vagina     Wet Prep       No Trichomonas seen  No yeast seen  No WBC's seen      Wet Prep Clue cells seen  Many   (A)

## 2021-05-12 NOTE — PATIENT INSTRUCTIONS
Patient Education     Bacterial Vaginosis    You have a vaginal infection called bacterial vaginosis (BV). Both good and bad bacteria are present in a healthy vagina. BV occurs when these bacteria get out of balance. The number of bad bacteria increase. And the number of good bacteria decrease. BV is linked with sexual activity, but it's not a sexually transmitted infection (STI).   BV may or may not cause symptoms. If symptoms do occur, they can include:     Thin, gray, milky-white, or sometimes green discharge    Unpleasant odor or  fishy  smell    Itching, burning, or pain in or around the vagina  It is not known what causes BV, but certain factors can make the problem more likely. These can include:     Douching    Spermicides    Use of antibiotics    Change in hormone levels with pregnancy, breastfeeding, or menopause    Having sex with a new partner    Having sex with more than one partner  BV will sometimes go away on its own. But treatment is often advised. This is because untreated BV can raise the risk of more serious health problems such as:     Pelvic inflammatory disease (PID)     delivery (giving birth to a baby early if you re pregnant)    HIV and some other sexually transmitted infections (STIs)    Infection after surgery on the reproductive organs  Home care  General care    BV is most often treated with medicines called antibiotics. These may be given as pills or as a vaginal cream. If antibiotics are prescribed, be sure to use them exactly as directed. And complete all of the medicine, even if your symptoms go away.    Don't douche or having sex during treatment.    If you have sex with a female partner, ask your healthcare provider if she should also be treated.  Prevention    Don't douche.    Don't have sex. If you do have sex, then take steps to lower your risk:  ? Use condoms when having sex.  ? Limit the number of sex partners you have.    Follow-up care  Follow up with your  healthcare provider, or as advised.   When to get medical advice  Call your healthcare provider right away if:     You have a fever of 100.4 F (38 C) or higher, or as directed by your provider.    Your symptoms get worse, or they don t go away within a few days of starting treatment.    You have new pain in the lower belly or pelvic region.    You have side effects that bother you or a reaction to the pills or cream you re prescribed.    You or any of your sex partners have new symptoms, such as a rash, joint pain, or sores.  [employer] last reviewed this educational content on 6/1/2020 2000-2021 [employer]. All rights reserved. This information is not intended as a substitute for professional medical care. Always follow your healthcare professional's instructions.

## 2021-06-14 ENCOUNTER — OFFICE VISIT (OUTPATIENT)
Dept: URGENT CARE | Facility: URGENT CARE | Age: 26
End: 2021-06-14
Payer: COMMERCIAL

## 2021-06-14 VITALS
BODY MASS INDEX: 17.85 KG/M2 | DIASTOLIC BLOOD PRESSURE: 51 MMHG | SYSTOLIC BLOOD PRESSURE: 91 MMHG | TEMPERATURE: 98.1 F | WEIGHT: 104 LBS | OXYGEN SATURATION: 97 % | HEART RATE: 80 BPM

## 2021-06-14 DIAGNOSIS — R35.0 URINARY FREQUENCY: Primary | ICD-10-CM

## 2021-06-14 LAB
ALBUMIN UR-MCNC: NEGATIVE MG/DL
APPEARANCE UR: CLEAR
BILIRUB UR QL STRIP: NEGATIVE
COLOR UR AUTO: YELLOW
GLUCOSE UR STRIP-MCNC: NEGATIVE MG/DL
HGB UR QL STRIP: NEGATIVE
KETONES UR STRIP-MCNC: NEGATIVE MG/DL
LEUKOCYTE ESTERASE UR QL STRIP: NEGATIVE
NITRATE UR QL: NEGATIVE
PH UR STRIP: 7 PH (ref 5–7)
SOURCE: NORMAL
SP GR UR STRIP: 1.02 (ref 1–1.03)
UROBILINOGEN UR STRIP-ACNC: 0.2 EU/DL (ref 0.2–1)

## 2021-06-14 PROCEDURE — 81003 URINALYSIS AUTO W/O SCOPE: CPT | Mod: QW | Performed by: FAMILY MEDICINE

## 2021-06-14 PROCEDURE — 99213 OFFICE O/P EST LOW 20 MIN: CPT

## 2021-06-14 NOTE — PROGRESS NOTES
"S: Very pleasant 25 old female with past medical history of urinary tract infections presents today with 1 week history of mild increased urinary frequency.  When she urinates she does not feel \"like I get it all out\".  ROS: Negative for hematuria, back pain, or bladder pain.    Meds: None    O: Blood pressure 91/51, temperature 98.1, pulse 80  Alert conversant acute distress  Skin Pink and dry  No CVA tenderness to palpation  No tenderness to palpation of the bladder.  No abdominal tenderness.    Laboratory: UA negative    A: Urinary frequency sensation    P: Cause for her sensations are unclear.  She appears healthy, has no symptoms, and her UA is negative.  Reassurance given.  Encourage hydration in this warm weather.  Follow-up if not improving.  "

## 2021-10-02 ENCOUNTER — HEALTH MAINTENANCE LETTER (OUTPATIENT)
Age: 26
End: 2021-10-02

## 2021-11-03 ENCOUNTER — OFFICE VISIT (OUTPATIENT)
Dept: URGENT CARE | Facility: URGENT CARE | Age: 26
End: 2021-11-03
Payer: COMMERCIAL

## 2021-11-03 VITALS
OXYGEN SATURATION: 100 % | TEMPERATURE: 97.3 F | HEART RATE: 76 BPM | SYSTOLIC BLOOD PRESSURE: 93 MMHG | DIASTOLIC BLOOD PRESSURE: 59 MMHG

## 2021-11-03 DIAGNOSIS — B96.89 BV (BACTERIAL VAGINOSIS): ICD-10-CM

## 2021-11-03 DIAGNOSIS — N76.0 BV (BACTERIAL VAGINOSIS): ICD-10-CM

## 2021-11-03 DIAGNOSIS — R35.0 URINARY FREQUENCY: Primary | ICD-10-CM

## 2021-11-03 LAB

## 2021-11-03 PROCEDURE — 81003 URINALYSIS AUTO W/O SCOPE: CPT

## 2021-11-03 PROCEDURE — 99213 OFFICE O/P EST LOW 20 MIN: CPT

## 2021-11-03 PROCEDURE — 87210 SMEAR WET MOUNT SALINE/INK: CPT

## 2021-11-03 RX ORDER — METRONIDAZOLE 500 MG/1
500 TABLET ORAL 2 TIMES DAILY
Qty: 14 TABLET | Refills: 0 | Status: SHIPPED | OUTPATIENT
Start: 2021-11-03 | End: 2021-11-10

## 2021-11-04 NOTE — PATIENT INSTRUCTIONS
Patient Education     Bacterial Vaginosis    You have a vaginal infection called bacterial vaginosis (BV). Both good and bad bacteria are present in a healthy vagina. BV occurs when these bacteria get out of balance. The number of bad bacteria increase. And the number of good bacteria decrease. BV is linked with sexual activity, but it's not a sexually transmitted infection (STI).   BV may or may not cause symptoms. If symptoms do occur, they can include:     Thin, gray, milky-white, or sometimes green discharge    Unpleasant odor or  fishy  smell    Itching, burning, or pain in or around the vagina  It is not known what causes BV, but certain factors can make the problem more likely. These can include:     Douching    Spermicides    Use of antibiotics    Change in hormone levels with pregnancy, breastfeeding, or menopause    Having sex with a new partner    Having sex with more than one partner  BV will sometimes go away on its own. But treatment is often advised. This is because untreated BV can raise the risk of more serious health problems such as:     Pelvic inflammatory disease (PID)     delivery (giving birth to a baby early if you re pregnant)    HIV and some other sexually transmitted infections (STIs)    Infection after surgery on the reproductive organs  Home care  General care    BV is most often treated with medicines called antibiotics. These may be given as pills or as a vaginal cream. If antibiotics are prescribed, be sure to use them exactly as directed. And complete all of the medicine, even if your symptoms go away.    Don't douche or having sex during treatment.    If you have sex with a female partner, ask your healthcare provider if she should also be treated.  Prevention    Don't douche.    Don't have sex. If you do have sex, then take steps to lower your risk:  ? Use condoms when having sex.  ? Limit the number of sex partners you have.    Follow-up care  Follow up with your  healthcare provider, or as advised.   When to get medical advice  Call your healthcare provider right away if:     You have a fever of 100.4 F (38 C) or higher, or as directed by your provider.    Your symptoms get worse, or they don t go away within a few days of starting treatment.    You have new pain in the lower belly or pelvic region.    You have side effects that bother you or a reaction to the pills or cream you re prescribed.    You or any of your sex partners have new symptoms, such as a rash, joint pain, or sores.  [employer] last reviewed this educational content on 6/1/2020 2000-2021 [employer]. All rights reserved. This information is not intended as a substitute for professional medical care. Always follow your healthcare professional's instructions.

## 2021-11-04 NOTE — PROGRESS NOTES
Assessment & Plan     Urinary frequency    - UA Macro with Reflex to Micro and Culture - lab collect  - Wet preparation    BV (bacterial vaginosis)    - metroNIDAZOLE (FLAGYL) 500 MG tablet; Take 1 tablet (500 mg) by mouth 2 times daily for 7 days      20 minutes spent on the date of the encounter doing chart review, review of test results, patient visit and documentation        See Patient Instructions    Return in about 1 week (around 11/10/2021), or if symptoms worsen or fail to improve.    [facility] Urgent Care Provider  [facility] URGENT CARE ILENE Wells is a 25 year old who presents for the following health issues    HPI     Vaginal Symptoms  Onset/Duration: 2 weeks  Description:  Vaginal Discharge: white   Itching (Pruritis): YES  Burning sensation:  no  Odor: no  Accompanying Signs & Symptoms:  Urinary symptoms: no  Abdominal pain: no  Fever: no  History:   Sexually active: YES  New Partner: no  Possibility of Pregnancy:  no  Recent antibiotic use: YES  Previous vaginitis issues: YES- cipro for UTI last week.   Precipitating or alleviating factors: None  Therapies tried and outcome: none  LMP 3 weeks ago  4 weeks ago had BV- sx went away with treatment        Review of Systems   Constitutional, HEENT, cardiovascular, pulmonary, gi and gu systems are negative, except as otherwise noted.      Objective    BP 93/59 (BP Location: Right arm, Patient Position: Sitting, Cuff Size: Adult Regular)   Pulse 76   Temp 97.3  F (36.3  C) (Oral)   SpO2 100%   Breastfeeding No   There is no height or weight on file to calculate BMI.  Physical Exam   GENERAL: healthy, alert and no distress  NECK: no adenopathy, no asymmetry, masses, or scars and thyroid normal to palpation  RESP: lungs clear to auscultation - no rales, rhonchi or wheezes  CV: regular rate and rhythm, normal S1 S2, no S3 or S4, no murmur, click or rub, no peripheral edema and peripheral pulses strong  ABDOMEN: soft, nontender, no  hepatosplenomegaly, no masses and bowel sounds normal  MS: no gross musculoskeletal defects noted, no edema    Results for orders placed or performed in visit on 11/03/21   UA Macro with Reflex to Micro and Culture - lab collect     Status: Normal    Specimen: Urine, Midstream   Result Value Ref [place]    Color Urine Yellow Colorless, Straw, Light Yellow, Yellow    Appearance Urine Clear Clear    Glucose Urine Negative Negative mg/dL    Bilirubin Urine Negative Negative    Ketones Urine Negative Negative mg/dL    Specific Gravity Urine 1.025 1.003 - 1.035    Blood Urine Negative Negative    pH Urine 6.5 5.0 - 7.0    Protein Albumin Urine Negative Negative mg/dL    Urobilinogen Urine 0.2 0.2, 1.0 E.U./dL    Nitrite Urine Negative Negative    Leukocyte Esterase Urine Negative Negative    Narrative    Microscopic not indicated   Wet preparation     Status: Abnormal    Specimen: Vagina; Swab   Result Value Ref [place]    Trichomonas Absent Absent    Yeast Absent Absent    Clue Cells Present (A) Absent    WBCs/high power field 1+ (A) None

## 2022-01-04 ENCOUNTER — E-VISIT (OUTPATIENT)
Dept: URGENT CARE | Facility: URGENT CARE | Age: 27
End: 2022-01-04
Payer: COMMERCIAL

## 2022-01-04 DIAGNOSIS — Z20.822 SUSPECTED COVID-19 VIRUS INFECTION: Primary | ICD-10-CM

## 2022-01-04 PROCEDURE — 99421 OL DIG E/M SVC 5-10 MIN: CPT | Performed by: PREVENTIVE MEDICINE

## 2022-01-04 NOTE — PATIENT INSTRUCTIONS
Priscilla,    Your symptoms show that you may have coronavirus (COVID-19). This illness can cause fever, cough and trouble breathing. Many people get a mild case and get better on their own. Some people can get very sick.    Because you reported additional symptoms, I would like to also test you for .    What should I do?  We would like to test you for COVID-19 virus and . I have placed orders for these tests.     For all employees or close contacts (except [place] and [place] - see below), go to your [employer] home page and scroll down to the section that says  You have an appointment that needs to be scheduled  and click the large green button that says  Schedule Now  and follow the steps to find the next available openings. It is important that when you are asked what the reason for your appointment is that you mention you need BOTH COVID and  tests.    If you are unable to complete these steps or if you cannot find any available times, please call [phone] to schedule employee testing.     [place] employees or close contacts, please call [phone].   [place] ([employer]) employees or close contacts call [phone].    Return to work/school/ guidance:   Please let your workplace manager and staffing office know when your isolation ends.       If you receive a positive COVID-19 test result, follow the guidance of the those who are giving you the results. Usually the return to work is 10 days from symptom onset or positive test date, whichever comes first (or in some cases 20 days if you are immunocompromised). If your symptoms started after your positive test, the 10 days should start when your symptoms started.   o If you work at [employer], you must also be cleared by Employee Occupational Health and Safety to return to work.      If you receive a negative COVID-19 test result and did not have a high risk exposure to someone with a known positive COVID-19 test, you can return to work once  you're free of fever for 24 hours without fever-reducing medication and your symptoms are improving or resolved.    If you receive a negative COVID-19 test and if you had a high risk exposure to someone who has tested positive for COVID-19 then you can return to work 14 days after your last contact with the positive individual. Follow quarantine guidance given by your doctor or public health officials.    Sign up for [employer].   We know it's scary to hear that you might have COVID-19. We want to track your symptoms to make sure you're okay over the next 2 weeks. Please look for an email from [employer]--this is a free, online program that we'll use to keep in touch. To sign up, follow the link in the email you will receive. Learn more at [email or web address]    How can I take care of myself?  Over the counter medications may help with your symptoms like congestion, cough, chills, or fever. I have sent in a prescription for .    There are not many effective prescription treatments for early COVID-19. Hydroxychloroquine, ivermectin, and azithromycin are not effective or recommended for COVID-19.    If your symptoms started in the last 10 days, you may be able to receive a treatment with monoclonal antibodies. This treatment can lower your risk of severe illness and going to the hospital. It is given through an IV or under your skin (subcutaneous) and must be given at an infusion center. You must be 12 or older, weight at least 88 pounds, and have a positive COVID-19 test.      If you would like to sign up to be considered to receive the monoclonal antibody medicine, please complete a participation form through the [facility] here:  MNRAP  ([email or web address]). You may also call the [facility] COVID-19 Public Hotline at 1-722.589.5604 (open Mon-Fri: 9am-7pm and Sat: 10am-6pm).     Not all people who are eligible will receive the medicine, since  supply is limited. You will be contacted in the next 1 to 2 business days only if you are selected. If you do not receive a call, you have not been selected to receive the medicine. If you have any questions about this medication, please contact your primary care provider. For more information, see [email or web address]/[email or web address]      Get lots of rest. Drink extra fluids (unless a doctor has told you not to)    Take Tylenol (acetaminophen) or ibuprofen for fever or pain. If you have liver or kidney problems, ask your family doctor if it's okay to take Tylenol or ibuprofen    Take over the counter medications for your symptoms, as directed by your doctor. You may also talk to your pharmacist.      If you have other health problems (like cancer, heart failure, an organ transplant or severe kidney disease): Call your specialty clinic if you don't feel better in the next 2 days.    Know when to call 911. Emergency warning signs include:  o Trouble breathing or shortness of breath  o Pain or pressure in the chest that doesn't go away  o Feeling confused like you haven't felt before, or not being able to wake up  o Bluish-colored lips or face    Where can I get more information?    [facility] - About COVID-19: [email or web address]     CDC - What to Do If You're Sick:  www.cdc.gov/coronavirus/2019-ncov/about/steps-when-sick.html    CDC - Ending Home Isolation:  https://www.cdc.gov/coronavirus/2019-ncov/your-health/quarantine-isolation.html    CDC - Caring for Someone:  www.cdc.gov/coronavirus/2019-ncov/if-you-are-sick/care-for-someone.html    [facility] clinical trials (COVID-19 research studies): clinicalaffairs.[facility]/umn-clinical-trials    Below are the COVID-19 hotlines at the [facility] of Health ([facility]). Interpreters are available.  o For health questions: Call 320-053-9875 or 1-748.241.7556 (7 a.m. to 7 p.m.)  o For questions about schools and  childcare: Call 055-835-1232 or 1-868.139.4046 (7 a.m. to 7 p.m.)

## 2022-01-07 ENCOUNTER — LAB (OUTPATIENT)
Dept: URGENT CARE | Facility: URGENT CARE | Age: 27
End: 2022-01-07
Attending: PREVENTIVE MEDICINE
Payer: COMMERCIAL

## 2022-01-07 DIAGNOSIS — Z20.822 SUSPECTED COVID-19 VIRUS INFECTION: ICD-10-CM

## 2022-01-07 LAB
DEPRECATED S PYO AG THROAT QL EIA: NEGATIVE
FLUAV AG SPEC QL IA: NEGATIVE
FLUBV AG SPEC QL IA: NEGATIVE
GROUP A STREP BY PCR: NOT DETECTED

## 2022-01-07 PROCEDURE — 87651 STREP A DNA AMP PROBE: CPT

## 2022-01-07 PROCEDURE — 87804 INFLUENZA ASSAY W/OPTIC: CPT

## 2022-01-07 PROCEDURE — 99207 PR NO CHARGE LOS: CPT

## 2022-01-07 PROCEDURE — U0005 INFEC AGEN DETEC AMPLI PROBE: HCPCS

## 2022-01-07 PROCEDURE — U0003 INFECTIOUS AGENT DETECTION BY NUCLEIC ACID (DNA OR RNA); SEVERE ACUTE RESPIRATORY SYNDROME CORONAVIRUS 2 (SARS-COV-2) (CORONAVIRUS DISEASE [COVID-19]), AMPLIFIED PROBE TECHNIQUE, MAKING USE OF HIGH THROUGHPUT TECHNOLOGIES AS DESCRIBED BY CMS-2020-01-R: HCPCS

## 2022-01-08 LAB — SARS-COV-2 RNA RESP QL NAA+PROBE: POSITIVE

## 2022-02-08 ENCOUNTER — OFFICE VISIT (OUTPATIENT)
Dept: URGENT CARE | Facility: URGENT CARE | Age: 27
End: 2022-02-08
Payer: COMMERCIAL

## 2022-02-08 VITALS
WEIGHT: 105 LBS | BODY MASS INDEX: 17.93 KG/M2 | TEMPERATURE: 98.6 F | SYSTOLIC BLOOD PRESSURE: 93 MMHG | OXYGEN SATURATION: 100 % | DIASTOLIC BLOOD PRESSURE: 54 MMHG | HEART RATE: 84 BPM | HEIGHT: 64 IN

## 2022-02-08 DIAGNOSIS — R35.0 URINARY FREQUENCY: ICD-10-CM

## 2022-02-08 DIAGNOSIS — N89.8 VAGINAL IRRITATION: Primary | ICD-10-CM

## 2022-02-08 LAB
ALBUMIN UR-MCNC: NEGATIVE MG/DL
APPEARANCE UR: CLEAR
BACTERIA #/AREA URNS HPF: ABNORMAL /HPF
BILIRUB UR QL STRIP: NEGATIVE
CLUE CELLS: NORMAL
COLOR UR AUTO: YELLOW
GLUCOSE UR STRIP-MCNC: NEGATIVE MG/DL
HGB UR QL STRIP: ABNORMAL
KETONES UR STRIP-MCNC: NEGATIVE MG/DL
LEUKOCYTE ESTERASE UR QL STRIP: NEGATIVE
MUCOUS THREADS #/AREA URNS LPF: PRESENT /LPF
NITRATE UR QL: NEGATIVE
PH UR STRIP: 5 [PH] (ref 5–7)
RBC #/AREA URNS AUTO: ABNORMAL /HPF
SP GR UR STRIP: >=1.03 (ref 1–1.03)
SQUAMOUS #/AREA URNS AUTO: ABNORMAL /LPF
TRICHOMONAS, WET PREP: NORMAL
UROBILINOGEN UR STRIP-ACNC: 0.2 E.U./DL
WBC #/AREA URNS AUTO: ABNORMAL /HPF
WBC'S/HIGH POWER FIELD, WET PREP: NORMAL
YEAST, WET PREP: NORMAL

## 2022-02-08 PROCEDURE — 87210 SMEAR WET MOUNT SALINE/INK: CPT | Performed by: FAMILY MEDICINE

## 2022-02-08 PROCEDURE — 99213 OFFICE O/P EST LOW 20 MIN: CPT | Performed by: FAMILY MEDICINE

## 2022-02-08 PROCEDURE — 87591 N.GONORRHOEAE DNA AMP PROB: CPT | Performed by: FAMILY MEDICINE

## 2022-02-08 PROCEDURE — 87491 CHLMYD TRACH DNA AMP PROBE: CPT | Performed by: FAMILY MEDICINE

## 2022-02-08 PROCEDURE — 87086 URINE CULTURE/COLONY COUNT: CPT | Performed by: FAMILY MEDICINE

## 2022-02-08 PROCEDURE — 81001 URINALYSIS AUTO W/SCOPE: CPT | Performed by: FAMILY MEDICINE

## 2022-02-08 ASSESSMENT — MIFFLIN-ST. JEOR: SCORE: 1206.28

## 2022-02-08 NOTE — PROGRESS NOTES
Chief Complaint   Patient presents with     Urgent Care     Pt in clinic to have eval for urinary frequency and vaginal irritation.     Frequency     Vaginal Itching     Initial differential diagnosis included but was not restricted to   Differential Diagnosis:  UTI: UTI, Dysuria, Urethritis, Vaginitis and STD  Medical Decision Making:    ASSESMENT AND PLAN   Priscilla was seen today for urgent care, frequency and vaginal itching.    Diagnoses and all orders for this visit:    Vaginal irritation  -     UA macro with reflex to Microscopic and Culture - Clinc Collect  -     Chlamydia trachomatis PCR; Future  -     Neisseria gonorrhoeae PCR; Future  -     Wet prep - Clinic Collect  -     Neisseria gonorrhoeae PCR  -     Chlamydia trachomatis PCR  -     Urine Microscopic    Urinary frequency  -     Urine Culture Aerobic Bacterial - lab collect; Future  -     Urine Culture Aerobic Bacterial - lab collect        Fluids and Rest  Routine discharge counseling was given to the patient and the patient understands that worsening, changing or persistent symptoms should prompt an immediate call or follow up with their primary physician or the emergency department. The importance of appropriate follow up was also discussed with the patient.     I have reviewed the nursing notes.  Review of the result(s) of each unique test       Time  spent on the date of the encounter doing chart review, review of test results, interpretation of tests, patient visit and documentation   see orders in Epic  Pt verbalized and agreed with the plan and is aware of the worsening symptoms for which would need to follow up .  Pt was stable during time of discharge from the clinic   Discussed with pt about the lab work     SUBJECTIVE     Priscilla Raymond is a 26 year old female presenting with a chief complaint of    Chief Complaint   Patient presents with     Urgent Care     Pt in clinic to have eval for urinary frequency and vaginal irritation.      "Frequency     Vaginal Itching     GYN Complaint    Onset of symptoms was 5 day(s) ago.  Course of illness is waxing and waning.    Severity moderate  Current and Associated symptoms: local irritation and urinary frequency  Treatment measures tried include:  None  Sexually active: yes,   Predisposing factors: None  Hx of previous symptom: none  She is having her cycles now             Past Medical History:   Diagnosis Date     Chlamydia      Eating disorder, unspecified     Age 16     UTI (urinary tract infection)     frequent     Current Outpatient Medications   Medication Sig Dispense Refill     FLUCELVAX QUADRIVALENT 0.5 ML ALLAN  (Patient not taking: Reported on 11/3/2021)       Social History     Tobacco Use     Smoking status: Never Smoker     Smokeless tobacco: Never Used   Substance Use Topics     Alcohol use: Yes     Comment: Occas     Family History   Problem Relation Age of Onset     Cervical Cancer Mother      Heart Disease Maternal Grandmother      Osteoporosis Maternal Grandmother      Lung Cancer Maternal Grandmother      Breast Cancer Paternal Grandmother      Colon Cancer Paternal Grandmother      No Known Problems Father      No Known Problems Sister      No Known Problems Brother      No Known Problems Maternal Grandfather      No Known Problems Other          ROS:    10 point ROS of systems including Constitutional, Eyes, Respiratory, Cardiovascular, Gastroenterology,  Integumentary, Muscularskeletal, Psychiatric ,neurological were all negative except for pertinent positives noted in my HPI         OBJECTIVE:    BP 93/54   Pulse 84   Temp 98.6  F (37  C) (Temporal)   Ht 1.626 m (5' 4\")   Wt 47.6 kg (105 lb)   LMP 02/03/2022   SpO2 100%   BMI 18.02 kg/m    GENERAL APPEARANCE: healthy, alert and no distress  EYES: EOMI,  PERRL, conjunctiva clear   mouth without ulcers, erythema or lesions  RESP: lungs clear to auscultation - no rales, rhonchi or wheezes  CV: regular rates and rhythm, normal S1 " S2, no murmur noted  MSK- no CVA tenderness noted   ABDOMEN:  soft, nontender, no HSM or masses and bowel sounds normal  PSYCH: mentation appears normal  Physical Exam      (Note was completed, in part, with [employer] voice-recognition software. Documentation reviewed, but some grammatical, spelling, and word errors may remain.)  Analisa Arana MD on 2/8/2022 at 4:12 PM

## 2022-02-09 LAB
BACTERIA UR CULT: NO GROWTH
C TRACH DNA SPEC QL NAA+PROBE: NEGATIVE
N GONORRHOEA DNA SPEC QL NAA+PROBE: NEGATIVE

## 2022-02-17 ENCOUNTER — OFFICE VISIT (OUTPATIENT)
Dept: OBGYN | Facility: CLINIC | Age: 27
End: 2022-02-17
Payer: COMMERCIAL

## 2022-02-17 VITALS
DIASTOLIC BLOOD PRESSURE: 58 MMHG | HEIGHT: 64 IN | BODY MASS INDEX: 17.93 KG/M2 | WEIGHT: 105 LBS | SYSTOLIC BLOOD PRESSURE: 102 MMHG

## 2022-02-17 DIAGNOSIS — N89.8 VAGINAL ODOR: ICD-10-CM

## 2022-02-17 DIAGNOSIS — B96.89 BACTERIAL VAGINOSIS: ICD-10-CM

## 2022-02-17 DIAGNOSIS — N76.0 BACTERIAL VAGINOSIS: ICD-10-CM

## 2022-02-17 DIAGNOSIS — T19.2XXA FOREIGN BODY IN VAGINA, INITIAL ENCOUNTER: Primary | ICD-10-CM

## 2022-02-17 LAB
ALBUMIN UR-MCNC: NEGATIVE MG/DL
APPEARANCE UR: CLEAR
BACTERIA #/AREA URNS HPF: ABNORMAL /HPF
BILIRUB UR QL STRIP: NEGATIVE
COLOR UR AUTO: YELLOW
GLUCOSE UR STRIP-MCNC: NEGATIVE MG/DL
HGB UR QL STRIP: NEGATIVE
KETONES UR STRIP-MCNC: NEGATIVE MG/DL
LEUKOCYTE ESTERASE UR QL STRIP: NEGATIVE
NITRATE UR QL: NEGATIVE
PH UR STRIP: 6 [PH] (ref 5–7)
RBC #/AREA URNS AUTO: ABNORMAL /HPF
SP GR UR STRIP: >=1.03 (ref 1–1.03)
SQUAMOUS #/AREA URNS AUTO: ABNORMAL /LPF
UROBILINOGEN UR STRIP-ACNC: 0.2 E.U./DL
WBC #/AREA URNS AUTO: ABNORMAL /HPF

## 2022-02-17 PROCEDURE — 87205 SMEAR GRAM STAIN: CPT | Performed by: OBSTETRICS & GYNECOLOGY

## 2022-02-17 PROCEDURE — 81001 URINALYSIS AUTO W/SCOPE: CPT | Performed by: OBSTETRICS & GYNECOLOGY

## 2022-02-17 PROCEDURE — 99213 OFFICE O/P EST LOW 20 MIN: CPT | Performed by: OBSTETRICS & GYNECOLOGY

## 2022-02-17 RX ORDER — CHLORHEXIDINE GLUCONATE ORAL RINSE 1.2 MG/ML
SOLUTION DENTAL
COMMUNITY
Start: 2021-03-05 | End: 2023-03-16

## 2022-02-17 NOTE — PROGRESS NOTES
SUBJECTIVE:                                                   Priscilla Raymond is a 26 year old female who presents to clinic today for the following health issue(s):      Sx/s of UTI. Possibly BV.     HPI:  1.5wk ago started with new onset vaginal discharge, odor, irritation. No new changes that she is aware of such as meds/partners.   Symptoms still present. Was evaluated 22 at   Last intercourse 5 d ago    Patient's last menstrual period was 2022..     Patient is sexually active, .  Using condoms for contraception.    reports that she has never smoked. She has never used smokeless tobacco.  STD testing offered?  Declined    Health maintenance updated:  yes    Today's PHQ-2 Score:   PHQ-2 (  [employer]) 2017   Q1: Little interest or pleasure in doing things 0   Q2: Feeling down, depressed or hopeless 0   PHQ-2 Score 0     Today's PHQ-9 Score:   PHQ-9 SCORE 2019   PHQ-9 Total Score 5     Today's AGUILA-7 Score:   AGUILA-7 SCORE 2019   Total Score 1       Problem list and histories reviewed & adjusted, as indicated.  Additional history: as documented.    Patient Active Problem List   Diagnosis     Eating disorder     Hematuria     Lipoma of left shoulder     Peripheral vascular disease (H)     Recurrent UTI     Symptoms involving cardiovascular system     Past Surgical History:   Procedure Laterality Date     ------------OTHER-------------      Lipoma removal     AS SURG TX MISSED ABORTN,2ND TRI  2015      Social History     Tobacco Use     Smoking status: Never Smoker     Smokeless tobacco: Never Used   Substance Use Topics     Alcohol use: Yes     Comment: Occas      Problem (# of Occurrences) Relation (Name,Age of Onset)    Breast Cancer (1) Paternal Grandmother    Cervical Cancer (1) Mother    Colon Cancer (1) Paternal Grandmother    Heart Disease (1) Maternal Grandmother    Lung Cancer (1) Maternal Grandmother    No Known Problems (5) Father, Sister, Brother, Maternal  "Grandfather, Other    Osteoporosis (1) Maternal Grandmother            Current Outpatient Medications   Medication Sig     chlorhexidine (PERIDEX) 0.12 % solution RINSE 15 ML S TWICE DAILY AFTER BREAKFAST/BEFORE BEDTIME FOLLOWING BRUSHING AND FLOSSING (Patient not taking: Reported on 2/17/2022)     No current facility-administered medications for this visit.     Allergies   Allergen Reactions     [place]      Mold      Other reaction(s): *Unknown     Seasonal Allergies       Other reactions(s): *Unknown       ROS:  12 point review of systems negative other than symptoms noted below or in the HPI.  No urinary frequency or dysuria, bladder or kidney problems      OBJECTIVE:     /58 (BP Location: Right arm, Patient Position: Sitting, Cuff Size: Adult Regular)   Ht 1.626 m (5' 4\")   Wt 47.6 kg (105 lb)   LMP 02/03/2022   Breastfeeding No   BMI 18.02 kg/m    Body mass index is 18.02 kg/m .    Exam:  Constitutional:  Appearance: Well nourished, well developed alert, in no acute distress  Lymphatic: Lymph Nodes:  No other lymphadenopathy present  Skin: General Inspection:  No rashes present, no lesions present, no areas of discoloration.  Neurologic:  Mental Status:  Oriented X3.  Normal strength and tone, sensory exam grossly normal, mentation intact and speech normal.    Psychiatric:  Mentation appears normal and affect normal/bright.  Pelvic Exam:  External Genitalia:     Normal appearance for age, no discharge present, no tenderness present, no inflammatory lesions present, color normal  Vagina:     Normal vaginal vault without central or paravaginal defects, no inflammatory lesions present, no masses present, CONDOM FOUND IN THE VAGINA, MALODOROUS  Bladder:     Nontender to palpation  Urethra:   Urethral Body:  Urethra palpation normal, urethra structural support normal   Urethral Meatus:  No erythema or lesions present  Cervix:     Appearance healthy, no lesions present, nontender to palpation, no " bleeding present  Perineum:     Perineum within normal limits, no evidence of trauma, no rashes or skin lesions present  Anus:     Anus within normal limits, no hemorrhoids present  Inguinal Lymph Nodes:     No lymphadenopathy present  Pubic Hair:     Normal pubic hair distribution for age  Genitalia and Groin:     No rashes present, no lesions present, no areas of discoloration, no masses present       In-Clinic Test Results:  Results for orders placed or performed in visit on 02/17/22 (from the past 24 hour(s))   UA with Microscopic reflex to Culture - lab collect    Specimen: Urine, Midstream   Result Value Ref [place]    Color Urine Yellow Colorless, Straw, Light Yellow, Yellow    Appearance Urine Clear Clear    Glucose Urine Negative Negative mg/dL    Bilirubin Urine Negative Negative    Ketones Urine Negative Negative mg/dL    Specific Gravity Urine >=1.030 1.003 - 1.035    Blood Urine Negative Negative    pH Urine 6.0 5.0 - 7.0    Protein Albumin Urine Negative Negative mg/dL    Urobilinogen Urine 0.2 0.2, 1.0 E.U./dL    Nitrite Urine Negative Negative    Leukocyte Esterase Urine Negative Negative   Urine Microscopic   Result Value Ref [place]    Bacteria Urine Few (A) None Seen /HPF    RBC Urine 0-2 0-2 /HPF /HPF    WBC Urine 0-5 0-5 /HPF /HPF    Squamous Epithelials Urine Moderate (A) None Seen /LPF    Narrative    Urine Culture not indicated   Bacterial Vaginosis Smear    Specimen: Vagina; Swab    Narrative    The following orders were created for panel order Bacterial Vaginosis Smear.  Procedure                               Abnormality         Status                     ---------                               -----------         ------                     Bacterial Vaginosis Stain[[id number]]                        In process                   Please view results for these tests on the individual orders.       ASSESSMENT/PLAN:                                                        ICD-10-CM    1. Foreign body  in vagina, initial encounter  T19.2XXA Bacterial Vaginosis Smear   2. Vaginal odor  N89.8        -Condom removed from vagina. Likely the source of her complaints.  BV smear obtained as well. Will notify of results  UA results reviewed with pt, negative for infection. Likely not source of her symptoms.   Questions answered    Imani Villafuertes, DO  [facility] [place]

## 2022-02-18 LAB
CLUE CELLS: ABNORMAL
NUGENT SCORE: 9
WHITE BLOOD CELLS: ABNORMAL

## 2022-02-18 RX ORDER — METRONIDAZOLE 7.5 MG/G
1 GEL VAGINAL DAILY
Qty: 35 G | Refills: 0 | Status: SHIPPED | OUTPATIENT
Start: 2022-02-18 | End: 2022-02-25

## 2022-04-05 ENCOUNTER — TRANSCRIBE ORDERS (OUTPATIENT)
Dept: OTHER | Age: 27
End: 2022-04-05
Payer: COMMERCIAL

## 2022-04-05 DIAGNOSIS — S46.819A TRAPEZIUS STRAIN: Primary | ICD-10-CM

## 2022-04-05 DIAGNOSIS — M54.50 LUMBAR PAIN: ICD-10-CM

## 2022-04-28 ENCOUNTER — THERAPY VISIT (OUTPATIENT)
Dept: PHYSICAL THERAPY | Facility: CLINIC | Age: 27
End: 2022-04-28
Attending: FAMILY MEDICINE
Payer: COMMERCIAL

## 2022-04-28 DIAGNOSIS — M54.59 POSTURAL LOW BACK PAIN: Primary | ICD-10-CM

## 2022-04-28 DIAGNOSIS — M54.50 LUMBAR PAIN: ICD-10-CM

## 2022-04-28 DIAGNOSIS — S46.819A TRAPEZIUS STRAIN: ICD-10-CM

## 2022-04-28 PROCEDURE — 97161 PT EVAL LOW COMPLEX 20 MIN: CPT | Mod: GP | Performed by: PHYSICAL THERAPIST

## 2022-04-28 PROCEDURE — 97112 NEUROMUSCULAR REEDUCATION: CPT | Mod: GP | Performed by: PHYSICAL THERAPIST

## 2022-04-28 PROCEDURE — 97110 THERAPEUTIC EXERCISES: CPT | Mod: GP | Performed by: PHYSICAL THERAPIST

## 2022-04-28 NOTE — PROGRESS NOTES
Physical Therapy Initial Evaluation  Subjective:  The history is provided by the patient.   Patient Health History  Priscilla Raymond being seen for Back neck pain, hand numbness.     Problem began: 4/5/2022.   Problem occurred: Noticed arm numbness in yoga.    Pain is reported as 2/10 on pain scale.  General health as reported by patient is good.   Other medical history details: Inc urination frequency (hx of chronic uti).   Red flags:  Changes in bowel and bladder habits and severe dizziness.             Current occupation is Nurse.   Primary job tasks include:  Lifting/carrying, pushing/pulling, repetitive tasks and computer work.   Other job/home tasks details: Yoga, aerial. At work patient transfers.                Therapist Generated HPI Evaluation         Type of problem:  Cervical spine.    This is a recurrent condition.  Condition occurred with:  Insidious onset.  Where condition occurred: other.  Patient reports pain:  Other, lower cervical spine and upper thoracic (lower back).  Pain is described as aching and sharp and is intermittent.  Pain radiates to:  Hand left, hand right, elbow left and elbow right. Pain timing: activity dependent.  Since onset symptoms are gradually worsening.  Associated symptoms:  Numbness, tingling and dizziness (inc occurence of motion sickness with activity). Symptoms are exacerbated by other (bending, twisting, lifting)  and relieved by ice and heat (has tried massage monthly, mild improvement).  Special tests included:  MRI (does not have results yet. ).                            Objective:  System         Lumbar/SI Evaluation  ROM:    AROM Lumbar:   Flexion:          Hypermobile  Ext:                    Wnl   Side Bend:        Left:  Wnl    Right:  Wnl  Rotation:           Left:     Right:   Side Glide:        Left:     Right:         Strength: + active SLR for lumbopelvic weakness L > R; glute med 3/5 B with TFL dominance, good glute max with prone hip ext  Lumbar  Myotomes:  normal                        Lumbar Provocation:      Left negative with:  Stork w/ext; Mobility and PROM hip    Right negative with:  Stork w/ext; Mobility and PROM hip         Cervical/Thoracic Evaluation    AROM:  AROM Cervical:    Flexion:          Nil  Extension:       Min - some lower cspine tightness  Rotation:         Left: wnl     Right: wnl  Side Bend:      Left:     Right:     Strength: poor periscap control, substitutes with thoracic and lumbar ext            Cervical Palpation:    Tenderness present at Left:    Sternocleidomstoid and Suboccipitals  Tenderness present at Right:    Sternocleidomstoid and Suboccipitals  Functional Tests:  Core strength and proprioception spine wnl: 3 Beighton Scale (L spine and knees)                                                  General     ROS    Assessment/Plan:    Patient is a 26 year old female with cervical and lumbar complaints.    Patient has the following significant findings with corresponding treatment plan.                Diagnosis 1:  Postural back pain    Pain -  hot/cold therapy, manual therapy, self management, education, directional preference exercise and home program  Decreased ROM/flexibility - manual therapy, therapeutic exercise and home program  Decreased strength - therapeutic exercise, therapeutic activities and home program  Decreased proprioception - neuro re-education, therapeutic activities and home program    Therapy Evaluation Codes:     Cumulative Therapy Evaluation is: Low complexity.    Previous and current functional limitations:  (See Goal Flow Sheet for this information)    Short term and Long term goals: (See Goal Flow Sheet for this information)     Communication ability:  Patient appears to be able to clearly communicate and understand verbal and written communication and follow directions correctly.  Treatment Explanation - The following has been discussed with the patient:   RX ordered/plan of care  Anticipated  outcomes  Possible risks and side effects  This patient would benefit from PT intervention to resume normal activities.   Rehab potential is good.    Frequency:  1 X week, once daily  Duration:  for 4 weeks tapering to 2 X a month over 8 weeks  Discharge Plan:  Achieve all LTG.  Independent in home treatment program.  Reach maximal therapeutic benefit.    Please refer to the daily flowsheet for treatment today, total treatment time and time spent performing 1:1 timed codes.

## 2022-04-29 PROBLEM — M54.50 LUMBAR PAIN: Status: ACTIVE | Noted: 2022-04-29

## 2022-04-29 PROBLEM — S46.819A TRAPEZIUS STRAIN: Status: ACTIVE | Noted: 2022-04-29

## 2022-05-14 ENCOUNTER — HEALTH MAINTENANCE LETTER (OUTPATIENT)
Age: 27
End: 2022-05-14

## 2022-06-07 ENCOUNTER — OFFICE VISIT (OUTPATIENT)
Dept: URGENT CARE | Facility: URGENT CARE | Age: 27
End: 2022-06-07
Payer: COMMERCIAL

## 2022-06-07 VITALS
HEART RATE: 85 BPM | DIASTOLIC BLOOD PRESSURE: 67 MMHG | BODY MASS INDEX: 18.19 KG/M2 | TEMPERATURE: 97.1 F | OXYGEN SATURATION: 99 % | WEIGHT: 106 LBS | SYSTOLIC BLOOD PRESSURE: 99 MMHG

## 2022-06-07 DIAGNOSIS — R30.0 DYSURIA: ICD-10-CM

## 2022-06-07 DIAGNOSIS — N76.0 BACTERIAL VAGINOSIS: Primary | ICD-10-CM

## 2022-06-07 DIAGNOSIS — B96.89 BACTERIAL VAGINOSIS: Primary | ICD-10-CM

## 2022-06-07 LAB

## 2022-06-07 PROCEDURE — 99213 OFFICE O/P EST LOW 20 MIN: CPT | Performed by: PHYSICIAN ASSISTANT

## 2022-06-07 PROCEDURE — 87210 SMEAR WET MOUNT SALINE/INK: CPT | Performed by: PHYSICIAN ASSISTANT

## 2022-06-07 PROCEDURE — 81003 URINALYSIS AUTO W/O SCOPE: CPT | Performed by: PHYSICIAN ASSISTANT

## 2022-06-07 RX ORDER — METRONIDAZOLE 500 MG/1
500 TABLET ORAL 2 TIMES DAILY
Qty: 14 TABLET | Refills: 0 | Status: SHIPPED | OUTPATIENT
Start: 2022-06-07 | End: 2022-06-14

## 2022-06-07 NOTE — PROGRESS NOTES
SUBJECTIVE:  Priscilla Raymond, a 26 year old female scheduled an appointment to discuss the following issues:     Bacterial vaginosis  Dysuria     A few days of vaginal discharge and odor. This is similar to past episodes of bacterial vaginosis. Denies other symptoms, no fevers, no  pain. No back pain or abdominal pain. She denies any concerns for STI, monogamous with a single partner.    Medical, social, surgical, and family histories reviewed.    ROS:  Constitutional, HEENT, cardiovascular, pulmonary, gi and gu systems are negative, except as otherwise noted.    OBJECTIVE:  BP 99/67   Pulse 85   Temp 97.1  F (36.2  C) (Oral)   Wt 48.1 kg (106 lb)   SpO2 99%   BMI 18.19 kg/m    EXAM:  GENERAL APPEARANCE: healthy, alert and no distress   (female): No CVA tenderness / no abdominal discomfort     Results for orders placed or performed in visit on 06/07/22   UA reflex to Microscopic and Culture     Status: Normal    Specimen: Urine, Midstream   Result Value Ref [place]    Color Urine Yellow Colorless, Straw, Light Yellow, Yellow    Appearance Urine Clear Clear    Glucose Urine Negative Negative mg/dL    Bilirubin Urine Negative Negative    Ketones Urine Negative Negative mg/dL    Specific Gravity Urine 1.020 1.003 - 1.035    Blood Urine Negative Negative    pH Urine 7.0 5.0 - 7.0    Protein Albumin Urine Negative Negative mg/dL    Urobilinogen Urine 0.2 0.2, 1.0 E.U./dL    Nitrite Urine Negative Negative    Leukocyte Esterase Urine Negative Negative    Narrative    Microscopic not indicated   Wet preparation     Status: Abnormal    Specimen: Vagina; Swab   Result Value Ref [place]    Trichomonas Absent Absent    Yeast Absent Absent    Clue Cells Present (A) Absent    WBCs/high power field 2+ (A) None        ASSESSMENT/PLAN:  (N76.0,  B96.89) Bacterial vaginosis  (primary encounter diagnosis)  Comment:   Plan: metroNIDAZOLE (FLAGYL) 500 MG tablet        Wet prep shows signs of BV. Reviewed options. Requests  treatment.  This prescription is given with a discussion of side effects, risks and proper use.  Instructions are given to follow up if not improving or symptoms change or worsen as discussed. Reviewed ETOH avoidance while on Flagyl.     (R30.0) Dysuria  Comment:   Plan: UA reflex to Microscopic and Culture, Wet         preparation        UA normal.    Follow up PRN if not improving.     Rene Michelle, MPAS, PA-C

## 2022-07-14 ENCOUNTER — E-VISIT (OUTPATIENT)
Dept: URGENT CARE | Facility: URGENT CARE | Age: 27
End: 2022-07-14
Payer: COMMERCIAL

## 2022-07-14 ENCOUNTER — LAB (OUTPATIENT)
Dept: URGENT CARE | Facility: URGENT CARE | Age: 27
End: 2022-07-14
Attending: PHYSICIAN ASSISTANT

## 2022-07-14 DIAGNOSIS — J02.9 SORE THROAT: ICD-10-CM

## 2022-07-14 DIAGNOSIS — Z20.822 SUSPECTED COVID-19 VIRUS INFECTION: ICD-10-CM

## 2022-07-14 LAB
DEPRECATED S PYO AG THROAT QL EIA: NEGATIVE
GROUP A STREP BY PCR: NOT DETECTED

## 2022-07-14 PROCEDURE — 99421 OL DIG E/M SVC 5-10 MIN: CPT | Mod: CS | Performed by: PHYSICIAN ASSISTANT

## 2022-07-14 PROCEDURE — U0005 INFEC AGEN DETEC AMPLI PROBE: HCPCS

## 2022-07-14 PROCEDURE — U0003 INFECTIOUS AGENT DETECTION BY NUCLEIC ACID (DNA OR RNA); SEVERE ACUTE RESPIRATORY SYNDROME CORONAVIRUS 2 (SARS-COV-2) (CORONAVIRUS DISEASE [COVID-19]), AMPLIFIED PROBE TECHNIQUE, MAKING USE OF HIGH THROUGHPUT TECHNOLOGIES AS DESCRIBED BY CMS-2020-01-R: HCPCS

## 2022-07-14 PROCEDURE — 87651 STREP A DNA AMP PROBE: CPT

## 2022-07-14 NOTE — PATIENT INSTRUCTIONS
Dear Priscilla,    Your symptoms show that you may have coronavirus (COVID-19). This illness can cause fever, cough and trouble breathing. Many people get a mild case and get better on their own. Some people can get very sick.    Because you also reported sore throat, I would like to also test you for Strep Throat to determine if we need to treat you for that as well.    What should I do?  For all employees or close contacts (except [place] and [place] - see below), go to your [employer] home page and scroll down to the section that says  You have an appointment that needs to be scheduled  and click the large green button that says  Schedule Now  and follow the steps to find the next available opening.  It is important that when you are asked what the reason for your appointment is that you mention you need BOTH Covid and Strep tests.    If you are unable to complete these steps or if you cannot find any available times, please call [phone] to schedule employee testing.     [place] employees or close contacts, please call [phone].   [place] ([place]) employees or close contacts call [phone].    Return to work guidance:   Please let your workplace manager and staffing office know when your isolation ends.   Note: if you tested through [facility], there is no need to report to [facility]. If you did not test through [employer], send a copy of your results to [email or web address]. [place] call [phone], [place] call [phone].     Please visit the Employee COVID-19 Testing Information page on the COVID-19 [employer] site. Here you will find return to work and testing guidance, high and low risk exposure definitions, and frequently asked questions.   [employer] URL: [email or web address]-testing.[email or web address]     How can I take care of myself?  Over the counter medications may help with your symptoms such as runny or stuffy nose,  cough, chills, or fever.  Talk to your care team about your options.     Some people are at high risk of severe illness (for example, you have a weak immune system, you re 65 years or older, or you have certain medical problems). If your risk is high and your symptoms started in the last 5 to 7 days, we strongly recommend for you to get COVID treatment as soon as possible. Paxlovid, Molnupiravir and the monoclonal antibody treatments are proven safe and effective, make you feel better faster, and prevent hospitalization and death.       To schedule an appointment to discuss COVID treatment, request an appointment on [employer] (select  COVID-19 Treatment ) or call [facility] ([phone]), press 7.      Get lots of rest. Drink extra fluids (unless a doctor has told you not to)    Take Tylenol (acetaminophen) or ibuprofen for fever or pain. If you have liver or kidney problems, ask your family doctor if it's okay to take Tylenol or ibuprofen    Take over the counter medications for your symptoms, as directed by your doctor. You may also talk to your pharmacist.      If you have other health problems (like cancer, heart failure, an organ transplant or severe kidney disease): Call your specialty clinic if you don't feel better in the next 2 days.    Know when to call 911. Emergency warning signs include:  o Trouble breathing or shortness of breath  o Pain or pressure in the chest that doesn't go away  o Feeling confused like you haven't felt before, or not being able to wake up  o Bluish-colored lips or face  Where can I get more information?    [facility] - About COVID-19: [email or web address]     CDC - What to Do If You're Sick: www.cdc.gov/coronavirus/2019-ncov/about/steps-when-sick.html    CDC - Ending Home Isolation: https://www.cdc.gov/coronavirus/2019-ncov/your-health/quarantine-isolation.html     [facility] clinical trials (COVID-19 research studies):  clinicalaffairs.[facility]-clinical-trials    Below are the COVID-19 hotlines at the [place] Department of [facility] ([facility]). Interpreters are available.  o For health questions: Call 268-320-4993 or 1-549.539.1999 (7 a.m. to 7 p.m.)  o For questions about schools and childcare: Call 620-292-6911 or 1-288.112.6594 (7 a.m. to 7 p.m.)  July 14, 2022  RE:  Priscilla Raymond                                                                                                                  [id number]  [facility] [place] [address]      To whom it may concern:    I evaluated Priscilla Raymond on July 14, 2022. Priscilla Raymond should be excused from work/school.     They should let their workplace manager and staffing office know when their quarantine ends.    We can not give an exact date as it depends on the information below. They can calculate this on their own or work with their manager/staffing office to calculate this. (For example if they were exposed on 10/04, they would have to quarantine for 14 full days. That would be through 10/18. They could return on 10/19.)    Quarantine Guidelines:    If patient receives a positive COVID-19 test result, they should follow the guidance of those who are giving the results. Usually the return to work is 10 (or in some cases 20 days from symptom onset.) If they work at [employer], they must be cleared by Employee Occupational Health and Safety to return to work.      If patient receives a negative COVID-19 test result and did not have a high risk exposure to someone with a known positive COVID-19 test, they can return to work once they're free of fever for 24 hours without fever-reducing medication and their symptoms are improving or resolved.    If patient receives a negative COVID-19 test and if they had a high risk exposure to someone who has tested positive for COVID-19 then they can return to work 14 days after their last contact with the positive individual    Note: If there is  ongoing exposure to the covid positive person, this quarantine period may be longer than 14 days. (For example, if they are continually exposed to their child, who tested positive and cannot isolate from them, then the quarantine of 7-14 days can't start until their child is no longer contagious. This is typically 10 days from onset to the child's symptoms. So the total duration may be 17-24 days in this case.)     Sincerely,  Flakito Winters, [facility], PA-C          o

## 2022-07-15 LAB — SARS-COV-2 RNA RESP QL NAA+PROBE: NEGATIVE

## 2022-07-22 NOTE — PROGRESS NOTES
[facility]    OUTPATIENT Physical Therapy ORTHOPEDIC EVALUATION  PLAN OF TREATMENT FOR OUTPATIENT REHABILITATION  (COMPLETE FOR INITIAL CLAIMS ONLY)  Patient's Last Name, First Name, M.I.  YOB: 1995  Priscilla Raymond    Provider s Name:  [facility]   Medical Record No.  [id number]   Start of Care Date:      Onset Date:   04/05/22   Type:     _X__PT   ___OT Medical Diagnosis:    Encounter Diagnoses   Name Primary?     Trapezius strain      Lumbar pain      Postural low back pain Yes        Treatment Diagnosis:  Trap Strain        Goals:     04/28/22 0500   Body Part   Goals listed below are for Neck   Goal #1   Goal #1 posture/body mechanics   Previous Functional Level Patient reports fair posture and proper body mechanics   Current Functional Level Poor posture/body mechanics   Performance level recurrent neck/back pain, poor periscap awareness   STG Target Performance Patient able to demonstrate good posture and body mechanics when prompted   Performance Level intermittent pain   Rationale to prevent neck/back pain and avoid injury when lifting/carrying and performing tasks requiring bending   Due Date 05/29/22   LTG Target Performance Improve patient awareness to maintain optimum posture the following percentage of time   Performance Level >75% of the time, able to demonstrate scapular and lumbopelvic control with minimal cueing   Rationale to prevent neck/back pain and avoid injury when lifting/carrying and performing tasks requiring bending   Due Date 06/28/22       Therapy Frequency:     Predicted Duration of Therapy Intervention:       Fern Crenshaw, PT                 I CERTIFY THE NEED FOR THESE SERVICES FURNISHED UNDER                    THIS PLAN OF TREATMENT AND WHILE UNDER MY CARE .             Physician Signature                                        Date    X_____________________________________________________                     Certification Date From:      Certification Date To:       Referring Provider:  Jericho Latif    Initial Assessment        See Epic Evaluation

## 2022-09-01 ENCOUNTER — OFFICE VISIT (OUTPATIENT)
Dept: OBGYN | Facility: CLINIC | Age: 27
End: 2022-09-01
Payer: COMMERCIAL

## 2022-09-01 VITALS
BODY MASS INDEX: 17.43 KG/M2 | WEIGHT: 104.6 LBS | HEIGHT: 65 IN | DIASTOLIC BLOOD PRESSURE: 64 MMHG | SYSTOLIC BLOOD PRESSURE: 98 MMHG

## 2022-09-01 DIAGNOSIS — N94.6 DYSMENORRHEA: Primary | ICD-10-CM

## 2022-09-01 DIAGNOSIS — Z01.419 ENCOUNTER FOR GYNECOLOGICAL EXAMINATION WITHOUT ABNORMAL FINDING: ICD-10-CM

## 2022-09-01 PROCEDURE — 99395 PREV VISIT EST AGE 18-39: CPT | Performed by: NURSE PRACTITIONER

## 2022-09-01 PROCEDURE — G0145 SCR C/V CYTO,THINLAYER,RESCR: HCPCS | Performed by: NURSE PRACTITIONER

## 2022-09-01 RX ORDER — CELECOXIB 100 MG/1
100 CAPSULE ORAL 2 TIMES DAILY
Qty: 30 CAPSULE | Refills: 0 | Status: SHIPPED | OUTPATIENT
Start: 2022-09-01

## 2022-09-01 ASSESSMENT — ANXIETY QUESTIONNAIRES
GAD7 TOTAL SCORE: 4
GAD7 TOTAL SCORE: 4
3. WORRYING TOO MUCH ABOUT DIFFERENT THINGS: NOT AT ALL
7. FEELING AFRAID AS IF SOMETHING AWFUL MIGHT HAPPEN: NOT AT ALL
6. BECOMING EASILY ANNOYED OR IRRITABLE: SEVERAL DAYS
2. NOT BEING ABLE TO STOP OR CONTROL WORRYING: NOT AT ALL
5. BEING SO RESTLESS THAT IT IS HARD TO SIT STILL: SEVERAL DAYS
1. FEELING NERVOUS, ANXIOUS, OR ON EDGE: NOT AT ALL
IF YOU CHECKED OFF ANY PROBLEMS ON THIS QUESTIONNAIRE, HOW DIFFICULT HAVE THESE PROBLEMS MADE IT FOR YOU TO DO YOUR WORK, TAKE CARE OF THINGS AT HOME, OR GET ALONG WITH OTHER PEOPLE: SOMEWHAT DIFFICULT

## 2022-09-01 ASSESSMENT — PATIENT HEALTH QUESTIONNAIRE - PHQ9
5. POOR APPETITE OR OVEREATING: MORE THAN HALF THE DAYS
SUM OF ALL RESPONSES TO PHQ QUESTIONS 1-9: 3

## 2022-09-01 NOTE — PROGRESS NOTES
Priscilla is a 26 year old  female who presents for annual exam.     Besides routine health maintenance, she has no other health concerns today .    HPI: here for annual exam.  Due for a pap this year.  Cycles are normal lasat 5 days, does have cramps bad the first 2 days.  Midol helps some.  No other concerns today.      The patient's PCP is  Physician No Ref-Primary.        GYNECOLOGIC HISTORY:    Patient's last menstrual period was 2022.    Regular menses? yes  Menses every 28-30 days.  Length of menses: 5 days    Her current contraception method is: none.  She  reports that she has never smoked. She has never used smokeless tobacco.    Patient is sexually active.  STD testing offered?  Declined  Last PHQ-9 score on record =   PHQ-9 SCORE 2019   PHQ-9 Total Score 5     Last GAD7 score on record =   AGUILA-7 SCORE 2019   Total Score 1     Alcohol Score = 0    HEALTH MAINTENANCE:  Cholesterol: (No results found for: CHOL   Last Mammo: Not applicable, Result: Not applicable, Next Mammo: Due at age 41   Pap: (  Lab Results   Component Value Date    PAP NIL 2019      Colonoscopy:  N/A, Result: Not applicable, Next Colonoscopy: age 51   Dexa:  N/A    Health maintenance updated:  yes    HISTORY:  OB History    Para Term  AB Living   1 0 0 0 1 0   SAB IAB Ectopic Multiple Live Births   0 1 0 0 0      # Outcome Date GA Lbr [employer]ak/2nd Weight Sex Delivery Anes PTL Lv   1 IAB                Patient Active Problem List   Diagnosis     Eating disorder     Hematuria     Lipoma of left shoulder     Peripheral vascular disease (H)     Recurrent UTI     Symptoms involving cardiovascular system     Trapezius strain     Lumbar pain     Past Surgical History:   Procedure Laterality Date     ------------OTHER-------------      Lipoma removal     AS SURG TX MISSED ABORTN,2ND TRI  2015      Social History     Tobacco Use     Smoking status: Never Smoker     Smokeless tobacco: Never Used   Substance  "Use Topics     Alcohol use: Yes     Comment: Occas      Problem (# of Occurrences) Relation (Name,Age of Onset)    Breast Cancer (1) Paternal Grandmother    Cervical Cancer (1) Mother    Colon Cancer (1) Paternal Grandmother    Heart Disease (1) Maternal Grandmother    Lung Cancer (1) Maternal Grandmother    No Known Problems (5) Father, Sister, Brother, Maternal Grandfather, Other    Osteoporosis (1) Maternal Grandmother            Current Outpatient Medications   Medication Sig     celecoxib (CELEBREX) 100 MG capsule Take 1 capsule (100 mg) by mouth 2 times daily     chlorhexidine (PERIDEX) 0.12 % solution RINSE 15 ML S TWICE DAILY AFTER BREAKFAST/BEFORE BEDTIME FOLLOWING BRUSHING AND FLOSSING (Patient not taking: Reported on 2/17/2022)     No current facility-administered medications for this visit.     Allergies   Allergen Reactions     [place]      Mold      Other reaction(s): *Unknown     Seasonal Allergies       Other reactions(s): *Unknown       Past medical, surgical, social and family histories were reviewed and updated in EPIC.    ROS:   12 point review of systems negative other than symptoms noted below or in the HPI.  Normal menstrual cycles    EXAM:  BP 98/64   Ht 1.656 m (5' 5.2\")   Wt 47.4 kg (104 lb 9.6 oz)   LMP 08/18/2022   Breastfeeding No   BMI 17.30 kg/m     BMI: Body mass index is 17.3 kg/m .    PHYSICAL EXAM:  Constitutional:   Appearance: Well nourished, well developed, alert, in no acute distress  Neck:  Lymph Nodes:  No lymphadenopathy present    Thyroid:  Gland size normal, nontender, no nodules or masses present  on palpation  Chest:  Respiratory Effort:  Breathing unlabored  Cardiovascular:    Heart: Auscultation:  Regular rate, normal rhythm, no murmurs present  Breasts: Inspection of Breasts:  No lymphadenopathy present., Palpation of Breasts and Axillae:  No masses present on palpation, no breast tenderness., Axillary Lymph Nodes:  No lymphadenopathy present. and No " nodularity, asymmetry or nipple discharge bilaterally.  Gastrointestinal:   Abdominal Examination:  Abdomen nontender to palpation, tone normal without rigidity or guarding, no masses present, umbilicus without lesions   Liver and Spleen:  No hepatomegaly present, liver nontender to palpation    Hernias:  No hernias present  Lymphatic: Lymph Nodes:  No other lymphadenopathy present  Skin:  General Inspection:  No rashes present, no lesions present, no areas of  discoloration  Neurologic:    Mental Status:  Oriented X3.  Normal strength and tone, sensory exam                grossly normal, mentation intact and speech normal.    Psychiatric:   Mentation appears normal and affect normal/bright.         Pelvic Exam:  External Genitalia:     Normal appearance for age, no discharge present, no tenderness present, no inflammatory lesions present, color normal  Vagina:     Normal vaginal vault without central or paravaginal defects, no discharge present, no inflammatory lesions present, no masses present  Bladder:     Nontender to palpation  Urethra:   Urethral Body:  Urethra palpation normal, urethra structural support normal   Urethral Meatus:  No erythema or lesions present  Cervix:     Appearance healthy, no lesions present, nontender to palpation, no bleeding present  Uterus:     Uterus: firm, normal sized and nontender, anteverted in position.   Adnexa:     No adnexal tenderness present, no adnexal masses present  Perineum:     Perineum within normal limits, no evidence of trauma, no rashes or skin lesions present  Anus:     Anus within normal limits, no hemorrhoids present  Inguinal Lymph Nodes:     No lymphadenopathy present  Pubic Hair:     Normal pubic hair distribution for age  Genitalia and Groin:     No rashes present, no lesions present, no areas of discoloration, no masses present      COUNSELING:   Reviewed preventive health counseling, as reflected in patient instructions       Regular exercise        Healthy diet/nutrition       Contraception       Safe sex practices/STD prevention    BMI: Body mass index is 17.3 kg/m .      ASSESSMENT:  26 year old female with satisfactory annual exam.    ICD-10-CM    1. Dysmenorrhea  N94.6 celecoxib (CELEBREX) 100 MG capsule   2. Encounter for gynecological examination without abnormal finding  Z01.419 Pap thin layer screen reflex to HPV if ASCUS - recommended age 25 - 29 years       PLAN:  Normal Gyn exam.  Discussed use of OCP;s for dysmenorrhea, patient declined for now.  Return prn or 1 year.  Pap every 3 years if normal.    BRAYDEN Rodríguez CNP

## 2022-09-04 ENCOUNTER — HEALTH MAINTENANCE LETTER (OUTPATIENT)
Age: 27
End: 2022-09-04

## 2022-09-06 LAB
BKR LAB AP GYN ADEQUACY: NORMAL
BKR LAB AP GYN INTERPRETATION: NORMAL
BKR LAB AP HPV REFLEX: NORMAL
BKR LAB AP PREVIOUS ABNORMAL: NORMAL
PATH REPORT.COMMENTS IMP SPEC: NORMAL
PATH REPORT.COMMENTS IMP SPEC: NORMAL
PATH REPORT.RELEVANT HX SPEC: NORMAL

## 2022-10-31 ENCOUNTER — OFFICE VISIT (OUTPATIENT)
Dept: URGENT CARE | Facility: URGENT CARE | Age: 27
End: 2022-10-31
Payer: COMMERCIAL

## 2022-10-31 VITALS
TEMPERATURE: 98.5 F | DIASTOLIC BLOOD PRESSURE: 62 MMHG | OXYGEN SATURATION: 100 % | HEART RATE: 75 BPM | SYSTOLIC BLOOD PRESSURE: 100 MMHG

## 2022-10-31 DIAGNOSIS — N89.8 VAGINAL DISCHARGE: Primary | ICD-10-CM

## 2022-10-31 LAB
ALBUMIN UR-MCNC: ABNORMAL MG/DL
APPEARANCE UR: CLEAR
BACTERIA #/AREA URNS HPF: ABNORMAL /HPF
BILIRUB UR QL STRIP: NEGATIVE
CLUE CELLS: ABNORMAL
COLOR UR AUTO: YELLOW
GLUCOSE UR STRIP-MCNC: NEGATIVE MG/DL
HGB UR QL STRIP: ABNORMAL
KETONES UR STRIP-MCNC: ABNORMAL MG/DL
LEUKOCYTE ESTERASE UR QL STRIP: ABNORMAL
NITRATE UR QL: NEGATIVE
PH UR STRIP: 7 [PH] (ref 5–7)
RBC #/AREA URNS AUTO: ABNORMAL /HPF
SP GR UR STRIP: 1.02 (ref 1–1.03)
SQUAMOUS #/AREA URNS AUTO: ABNORMAL /LPF
TRICHOMONAS, WET PREP: ABNORMAL
UROBILINOGEN UR STRIP-ACNC: 1 E.U./DL
WBC #/AREA URNS AUTO: ABNORMAL /HPF
WBC'S/HIGH POWER FIELD, WET PREP: ABNORMAL
YEAST, WET PREP: ABNORMAL

## 2022-10-31 PROCEDURE — 81001 URINALYSIS AUTO W/SCOPE: CPT

## 2022-10-31 PROCEDURE — 87210 SMEAR WET MOUNT SALINE/INK: CPT

## 2022-10-31 PROCEDURE — 99213 OFFICE O/P EST LOW 20 MIN: CPT | Performed by: PHYSICIAN ASSISTANT

## 2022-10-31 RX ORDER — FLUCONAZOLE 150 MG/1
150 TABLET ORAL
Qty: 3 TABLET | Refills: 0 | Status: SHIPPED | OUTPATIENT
Start: 2022-10-31

## 2022-10-31 NOTE — PROGRESS NOTES
SUBJECTIVE:  Priscilla Raymond is a 26 year old female who comes in with a several day history of vaginal discharge.  Patient is concerned for possible yeast infection.  Does have some mild irritation.  Denies any UTI associated symptoms.  There is no blood or abdominal pain.  No concern for STDs.  She is otherwise at baseline health      Past Medical History:   Diagnosis Date     Chlamydia      Eating disorder, unspecified     Age 16     UTI (urinary tract infection)     frequent     Current Outpatient Medications   Medication     celecoxib (CELEBREX) 100 MG capsule     chlorhexidine (PERIDEX) 0.12 % solution     No current facility-administered medications for this visit.     Social History     Socioeconomic History     Marital status: Single     Spouse name: Not on file     Number of children: Not on file     Years of education: Not on file     Highest education level: Not on file   Occupational History     Not on file   Tobacco Use     Smoking status: Never     Smokeless tobacco: Never   Substance and Sexual Activity     Alcohol use: Yes     Comment: Occas     Drug use: Not Currently     Types: Marijuana     Comment: rare     Sexual activity: Yes     Partners: Male     Birth control/protection: Condom     Comment: sometimes   Other Topics Concern     Parent/sibling w/ CABG, MI or angioplasty before 65F 55M? 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[employer])     Social Determinants of Health     Financial Resource Strain: Not on file   Food Insecurity: Not on file   Transportation Needs: Not on file   Physical Activity: Not on file   Stress: Not on file   Social Connections: Not on file   Intimate Partner Violence: Not on file   Housing Stability: Not on file     ROS  Negative other than stated above    Exam:  GENERAL APPEARANCE: healthy, alert and no distress  EYES: EOMI,  PERRL  RESP: lungs clear to auscultation - no rales, rhonchi or wheezes  CV: regular rates and rhythm, normal S1 S2, no S3  or S4 and no murmur, click or rub -  ABDOMEN:  soft, nontender, no HSM or masses and bowel sounds normal  GU_female: Self swabs were obtained    Results for orders placed or performed in visit on 10/31/22   UA macro with reflex to Microscopic and Culture - Clinc Collect     Status: Abnormal    Specimen: Urine, Clean Catch   Result Value Ref Range    Color Urine Yellow Colorless, Straw, Light Yellow, Yellow    Appearance Urine Clear Clear    Glucose Urine Negative Negative, 1000 , >=2000 mg/dL    Bilirubin Urine Negative Negative    Ketones Urine Trace (A) Negative, 160  mg/dL    Specific Gravity Urine 1.020 1.003 - 1.035    Blood Urine Small (A) Negative    pH Urine 7.0 5.0 - 7.0    Protein Albumin Urine Trace (A) Negative, 300 , >=2000 mg/dL    Urobilinogen Urine 1.0 0.2, 1.0 E.U./dL    Nitrite Urine Negative Negative    Leukocyte Esterase Urine Trace (A) Negative   Urine Microscopic     Status: Abnormal   Result Value Ref Range    Bacteria Urine Many (A) None Seen /HPF    RBC Urine 10-25 (A) 0-2 /HPF /HPF    WBC Urine 0-5 0-5 /HPF /HPF    Squamous Epithelials Urine Moderate (A) None Seen /LPF    Narrative    Urine Culture not indicated   Wet prep - Clinic Collect     Status: Abnormal    Specimen: Vagina; Swab   Result Value Ref Range    Trichomonas Absent Absent    Yeast Absent Absent    Clue Cells Absent Absent    WBCs/high power field 2+ (A) None     assessment/plan:  (N89.8) Vaginal discharge  (primary encounter diagnosis)  Comment:   Plan: fluconazole (DIFLUCAN) 150 MG tablet          Patient with symptoms/concern for possible yeast infection.  Wet prep was clear but will treat based off of symptoms.  No other signs of infection including no UTI.  Advised may be normal vaginal discharge but will treat.  Follow-up with primary as needed

## 2023-03-16 ENCOUNTER — OFFICE VISIT (OUTPATIENT)
Dept: URGENT CARE | Facility: URGENT CARE | Age: 28
End: 2023-03-16
Payer: COMMERCIAL

## 2023-03-16 VITALS
OXYGEN SATURATION: 100 % | DIASTOLIC BLOOD PRESSURE: 68 MMHG | TEMPERATURE: 98.2 F | BODY MASS INDEX: 17.49 KG/M2 | SYSTOLIC BLOOD PRESSURE: 110 MMHG | WEIGHT: 105 LBS | RESPIRATION RATE: 14 BRPM | HEIGHT: 65 IN | HEART RATE: 85 BPM

## 2023-03-16 DIAGNOSIS — N89.8 VAGINAL DISCHARGE: Primary | ICD-10-CM

## 2023-03-16 DIAGNOSIS — K64.8 INTERNAL HEMORRHOID: ICD-10-CM

## 2023-03-16 LAB
CLUE CELLS: NORMAL
TRICHOMONAS, WET PREP: NORMAL
WBC'S/HIGH POWER FIELD, WET PREP: NORMAL
YEAST, WET PREP: NORMAL

## 2023-03-16 PROCEDURE — 87210 SMEAR WET MOUNT SALINE/INK: CPT | Performed by: NURSE PRACTITIONER

## 2023-03-16 PROCEDURE — 87591 N.GONORRHOEAE DNA AMP PROB: CPT | Performed by: NURSE PRACTITIONER

## 2023-03-16 PROCEDURE — 87491 CHLMYD TRACH DNA AMP PROBE: CPT | Performed by: NURSE PRACTITIONER

## 2023-03-16 PROCEDURE — 99213 OFFICE O/P EST LOW 20 MIN: CPT | Performed by: NURSE PRACTITIONER

## 2023-03-16 RX ORDER — HYDROCORTISONE ACETATE 25 MG/1
25 SUPPOSITORY RECTAL 2 TIMES DAILY
Qty: 28 SUPPOSITORY | Refills: 0 | Status: SHIPPED | OUTPATIENT
Start: 2023-03-16 | End: 2023-03-30

## 2023-03-16 NOTE — PATIENT INSTRUCTIONS
Your BV results were negative here today. The gonorrhea and chlamydia test will come back tomorrow and we will message you if it is positive and start you on medication. If it is negative please follow up with PCP for further evaluation.

## 2023-03-16 NOTE — PROGRESS NOTES
Assessment & Plan       ICD-10-CM    1. Vaginal discharge  N89.8 Wet prep - Clinic Collect     Chlamydia & Gonorrhea by PCR, [facility]/Range - Clinic Collect      2. Internal hemorrhoid  K64.8 hydrocortisone (ANUSOL-HC) 25 MG suppository           Patient instructions:  Your BV results were negative here today. The gonorrhea and chlamydia test will come back tomorrow and we will message you if it is positive and start you on medication. If it is negative please follow up with PCP for further evaluation.     Medical decision making:  Pt is a well appearing 27 year old who presents for vaginal discharge and hemorrhoids. Pt reports discharge has been ongoing for past month, feels like her typical BV. BV testing negative here today, GC/Chlamydia testing sent and will message patient if positive. No signs of PID today on exam. Small hemorrhoid noted to left side of anus. Pt given rx for rectal suppositories to use as needed for next 2 weeks. If no improvement or worsening symptoms please follow up with PCP. Pt also instructed to go to ED if she develops abdominal pain. Pt agrees with plan.    Results for orders placed or performed in visit on 03/16/23   Wet prep - Clinic Collect     Status: Normal    Specimen: Vagina; Swab   Result Value Ref Range    Trichomonas Absent Absent    Yeast Absent Absent    Clue Cells Absent Absent    WBCs/high power field None None          No follow-ups on file.    At the end of the encounter, I discussed results, diagnosis, medications. Discussed red flags for immediate return to clinic/ER, as well as indications for follow up if no improvement. Patient understood and agreed to plan. Patient was stable for discharge.    Enma Wells is a 27 year old female who presents to clinic today the following health issues:  Chief Complaint   Patient presents with     Urgent Care     BV x 1 month, history of BV per patient. Hemorid x 3 days.       Pt reports vaginal discharge x past month. Pt  "reports this feels like her BV which she has had in the past. Pt would also like to be tested for gonorrhea and chlamydia today though states no history of exposure. Denies any UTI symptoms or pelvic pain. Denies fevers. Pt reports hemmroid x past 3 days as well. Small amount of bleeding when wiping, no issues with constipation recently.           Review of Systems    Problem List:  2022-04: Trapezius strain  2022-04: Lumbar pain  2019-04: Hematuria  2019-04: Lipoma of left shoulder  2019-04: Recurrent UTI  2017-08: Nexplanon in place  2011-03: Absence of menstruation  2011-03: Eating disorder  2011-03: Peripheral vascular disease (H)  2011-03: Symptoms involving cardiovascular system      Past Medical History:   Diagnosis Date     Chlamydia      Eating disorder, unspecified     Age 16     UTI (urinary tract infection)     frequent       Social History     Tobacco Use     Smoking status: Never     Smokeless tobacco: Never   Substance Use Topics     Alcohol use: Yes     Comment: Occas           Objective    /68   Pulse 85   Temp 98.2  F (36.8  C) (Temporal)   Resp 14   Ht 1.651 m (5' 5\")   Wt 47.6 kg (105 lb)   SpO2 100%   BMI 17.47 kg/m    Physical Exam  Constitutional:       Appearance: Normal appearance. She is not toxic-appearing or diaphoretic.   HENT:      Head: Normocephalic and atraumatic.      Nose: Nose normal.      Mouth/Throat:      Mouth: Mucous membranes are moist.   Cardiovascular:      Rate and Rhythm: Normal rate and regular rhythm.   Pulmonary:      Effort: Pulmonary effort is normal.      Breath sounds: Normal breath sounds.   Genitourinary:     Rectum: Tenderness and internal hemorrhoid present.      Comments: Small internal hemorrhoid noted to left side of rectum. Tender to palpation, no signs of surrounding infection or abscess.   Neurological:      Mental Status: She is alert.              BRAYDEN CESAR CNP    "

## 2023-03-17 LAB
C TRACH DNA SPEC QL PROBE+SIG AMP: NEGATIVE
N GONORRHOEA DNA SPEC QL NAA+PROBE: NEGATIVE

## 2023-06-07 ENCOUNTER — LAB REQUISITION (OUTPATIENT)
Dept: LAB | Facility: CLINIC | Age: 28
End: 2023-06-07

## 2023-06-07 DIAGNOSIS — Z11.3 ENCOUNTER FOR SCREENING FOR INFECTIONS WITH A PREDOMINANTLY SEXUAL MODE OF TRANSMISSION: ICD-10-CM

## 2023-06-07 LAB
HIV 1+2 AB+HIV1 P24 AG SERPL QL IA: NONREACTIVE
T PALLIDUM AB SER QL: NONREACTIVE

## 2023-06-07 PROCEDURE — 87491 CHLMYD TRACH DNA AMP PROBE: CPT | Performed by: NURSE PRACTITIONER

## 2023-06-07 PROCEDURE — 86780 TREPONEMA PALLIDUM: CPT | Performed by: NURSE PRACTITIONER

## 2023-06-07 PROCEDURE — 87591 N.GONORRHOEAE DNA AMP PROB: CPT | Performed by: NURSE PRACTITIONER

## 2023-06-07 PROCEDURE — 87389 HIV-1 AG W/HIV-1&-2 AB AG IA: CPT | Performed by: NURSE PRACTITIONER

## 2023-06-08 LAB
C TRACH DNA SPEC QL PROBE+SIG AMP: NEGATIVE
N GONORRHOEA DNA SPEC QL NAA+PROBE: NEGATIVE

## 2023-06-22 ENCOUNTER — TRANSCRIBE ORDERS (OUTPATIENT)
Dept: OTHER | Age: 28
End: 2023-06-22

## 2023-06-22 DIAGNOSIS — M54.42 BILATERAL LOW BACK PAIN WITH LEFT-SIDED SCIATICA: ICD-10-CM

## 2023-06-22 DIAGNOSIS — M54.50 LOW BACK PAIN: Primary | ICD-10-CM

## 2023-06-28 ENCOUNTER — THERAPY VISIT (OUTPATIENT)
Dept: PHYSICAL THERAPY | Facility: CLINIC | Age: 28
End: 2023-06-28
Attending: FAMILY MEDICINE
Payer: COMMERCIAL

## 2023-06-28 DIAGNOSIS — M54.42 BILATERAL LOW BACK PAIN WITH LEFT-SIDED SCIATICA, UNSPECIFIED CHRONICITY: ICD-10-CM

## 2023-06-28 DIAGNOSIS — M54.6 ACUTE LEFT-SIDED THORACIC BACK PAIN: ICD-10-CM

## 2023-06-28 DIAGNOSIS — M54.50 LOW BACK PAIN: ICD-10-CM

## 2023-06-28 DIAGNOSIS — M54.50 ACUTE RIGHT-SIDED LOW BACK PAIN WITHOUT SCIATICA: ICD-10-CM

## 2023-06-28 PROCEDURE — 97161 PT EVAL LOW COMPLEX 20 MIN: CPT | Mod: GP | Performed by: PHYSICAL THERAPIST

## 2023-06-28 PROCEDURE — 97110 THERAPEUTIC EXERCISES: CPT | Mod: GP | Performed by: PHYSICAL THERAPIST

## 2023-06-28 NOTE — PROGRESS NOTES
PHYSICAL THERAPY EVALUATION  Type of Visit: Evaluation    See electronic medical record for Abuse and Falls Screening details.    Subjective      Presenting condition or subjective complaint: back pain     5/23/23. 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 Had a small amount of pain but then the next day is when it got worse.   Pain is more lower right back and then upper thoracic. Does describe some neck soreness.  Denies leg pain. denies N/T, but reports they found some paresthesia in lower right leg.   Lying down, lying on side is hard. Walking is fine. Sitting is good if in good posture.  Normally does yoga but that is painful now.  Is currently on work lifting restrictions of 20-30 lbs.  Is waiting to see is  approves getting MRI.    Date of onset: 05/23/23 (6/22/23. MD order)    Relevant medical history: Anemia; Bladder or bowel problems; Depression; Dizziness 5/23/23  Dates & types of surgery: 2019 - lipoma removal    Prior diagnostic imaging/testing results:   possibly having MRI in future   Prior therapy history for the same diagnosis, illness or injury: No      Living Environment  Social support: Alone   Type of home: Apartment/condo   Stairs to enter the home: Yes 10 Is there a railing: Yes   Ramp: No   Stairs inside the home: No       Help at home: None  Equipment owned:       Employment:   nurse  Hobbies/Interests: instruments, dance, art, biking, hiking, yoga    Patient goals for therapy: my job, heavy workouts, sleep better       Objective   LUMBAR SPINE EVALUATION  PAIN: Pain Level at Rest: 0/10  Pain Level with Use: 4/10  Pain Location: lower right lumbar, left thoracic  Pain Quality: Aching, Dull and spasm  Pain Frequency: intermittent  Pain is Worst: nighttime or and AM  Pain is Exacerbated By: laying down, lying on side, sitting at weird angles  Pain is Relieved By: NSAIDs and muscle relaxers  Pain Progression: Unchanged    Postural Observation:   Sitting:  fair      AROM: (Major, Moderate, Minimal or Nil loss)  Movement Loss Reed Mod Min Nil Pain   Flexion    x NE   Extension  x   Lower R LBP   Sidebend R    x Low L LBP   Sidebend L    x NE   Thoracic AROM: flexion WNL, ext mod limit -mild pain. L ROT WNL, R ROT min limit    Test Movements:   During: produces, abolishes, increases, decreases, no effect, centralizing, peripheralizing   After: better, worse, no better, no worse, no effect, centralized, peripheralized    Symptomatic response Mechanical response    During testing After testing Effect - increased ROM, decreased ROM, or key functional test No Effect   Pretest symptoms standing: painfree at rest     Rep FIS No Effect    No Effect     Rep EIS Decreases    Better             During testing After testing Effect - increased ROM, decreased ROM, or key functional test No Effect   Rep DINORA         Rep EIL Produces    Better          Seated thoracic extensions: Better left thoracic pain after    Provisional Classification: thoracic and lumbar Derangement - Bilateral, symmetrical, symptoms above knee    STRENGTH  Hip extension: L 4/5, R 5-/5  PALPATION: TTP left mid-lower thoracic paraspinals and right lower thoracic-upper lumbar paraspinals  SPINAL SEGMENTAL CONCLUSIONS: mild hypomobility mid-lower thoracic spine, mild pain with PA spring testing. mild pain midlumbar PA spring testing        Assessment & Plan   CLINICAL IMPRESSIONS   Medical Diagnosis: Low back pain    Treatment Diagnosis: left thoracic/right low back pain   Impression/Assessment: Patient is a 27 year old female with thoracic and low back complaints.  The following significant findings have been identified: Pain, Decreased ROM/flexibility, Decreased joint mobility, Decreased strength, Decreased activity tolerance and Impaired posture. These impairments interfere with their ability to perform self care tasks, work tasks and recreational activities as compared to previous level of function.     Clinical  Decision Making (Complexity):   Clinical Presentation: Stable/Uncomplicated  Clinical Presentation Rationale: based on medical and personal factors listed in PT evaluation  Clinical Decision Making (Complexity): Low complexity    PLAN OF CARE  Treatment Interventions:  Interventions: Manual Therapy, Neuromuscular Re-education, Therapeutic Activity, Therapeutic Exercise, Self-Care/Home Management    Long Term Goals     PT Goal 1  Goal Identifier: low back/sleeping  Goal Description: pt will be able to sleep on either side, unrestricted, pain 0-1/10  Rationale:  (to return to uninterrupted sleep pattern)  Goal Progress: pain 3-4/10 with Side lying  Target Date: 08/27/23      Frequency of Treatment: 1x/week  Duration of Treatment: 6 weeks    Education Assessment:        Risks and benefits of evaluation/treatment have been explained.   Patient/Family/caregiver agrees with Plan of Care.     Evaluation Time:     PT Eval, Low Complexity Minutes (40450): 20      Signing Clinician: Elizabeth Malik PT

## 2023-07-11 ENCOUNTER — THERAPY VISIT (OUTPATIENT)
Dept: PHYSICAL THERAPY | Facility: CLINIC | Age: 28
End: 2023-07-11
Payer: COMMERCIAL

## 2023-07-11 DIAGNOSIS — M54.50 ACUTE RIGHT-SIDED LOW BACK PAIN WITHOUT SCIATICA: ICD-10-CM

## 2023-07-11 DIAGNOSIS — M54.6 ACUTE LEFT-SIDED THORACIC BACK PAIN: ICD-10-CM

## 2023-07-11 DIAGNOSIS — M54.42 BILATERAL LOW BACK PAIN WITH LEFT-SIDED SCIATICA, UNSPECIFIED CHRONICITY: Primary | ICD-10-CM

## 2023-07-11 PROCEDURE — 97530 THERAPEUTIC ACTIVITIES: CPT | Mod: GP | Performed by: PHYSICAL THERAPIST

## 2023-07-11 PROCEDURE — 97110 THERAPEUTIC EXERCISES: CPT | Mod: GP | Performed by: PHYSICAL THERAPIST

## 2023-07-18 ENCOUNTER — THERAPY VISIT (OUTPATIENT)
Dept: PHYSICAL THERAPY | Facility: CLINIC | Age: 28
End: 2023-07-18
Payer: COMMERCIAL

## 2023-07-18 DIAGNOSIS — M54.6 ACUTE LEFT-SIDED THORACIC BACK PAIN: ICD-10-CM

## 2023-07-18 DIAGNOSIS — M54.50 ACUTE RIGHT-SIDED LOW BACK PAIN WITHOUT SCIATICA: ICD-10-CM

## 2023-07-18 DIAGNOSIS — M54.42 BILATERAL LOW BACK PAIN WITH LEFT-SIDED SCIATICA, UNSPECIFIED CHRONICITY: Primary | ICD-10-CM

## 2023-07-18 PROCEDURE — 97530 THERAPEUTIC ACTIVITIES: CPT | Mod: GP | Performed by: PHYSICAL THERAPIST

## 2023-07-18 PROCEDURE — 97110 THERAPEUTIC EXERCISES: CPT | Mod: GP | Performed by: PHYSICAL THERAPIST

## 2023-07-25 ENCOUNTER — THERAPY VISIT (OUTPATIENT)
Dept: PHYSICAL THERAPY | Facility: CLINIC | Age: 28
End: 2023-07-25
Payer: COMMERCIAL

## 2023-07-25 DIAGNOSIS — M54.50 ACUTE LOW BACK PAIN: ICD-10-CM

## 2023-07-25 DIAGNOSIS — M54.2 NECK PAIN: Primary | ICD-10-CM

## 2023-07-25 PROCEDURE — 97110 THERAPEUTIC EXERCISES: CPT | Mod: GP | Performed by: PHYSICAL THERAPIST

## 2023-07-25 PROCEDURE — 97161 PT EVAL LOW COMPLEX 20 MIN: CPT | Mod: GP | Performed by: PHYSICAL THERAPIST

## 2023-07-25 NOTE — PROGRESS NOTES
PHYSICAL THERAPY EVALUATION  Type of Visit: Evaluation    See electronic medical record for Abuse and Falls Screening details.    Subjective       Presenting condition or subjective complaint: back pain  5/23/23. 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   Pain mostly midline and slightly to the right. Mid-lower cervical spine.   Has had some headaches associated with it.    7/24/23  - did see MD yesterday and had injections on both neck and back, unsure of response yet.   Will be returning to work next week without restrictions.    Date of onset: 05/23/23    Relevant medical history: Anemia; Bladder or bowel problems; Depression; Dizziness   Dates & types of surgery: 2019 - lipoma removal    Prior diagnostic imaging/testing results:   possibly having MRI in future   Prior therapy history for the same diagnosis, illness or injury: No        Living Environment  Social support: Alone   Type of home: Apartment/condo   Stairs to enter the home: Yes 10 Is there a railing: Yes   Ramp: No   Stairs inside the home: No       Help at home: None  Equipment owned:       Employment:   nurse  Hobbies/Interests: instruments, dance, art, biking, hiking, yoga    Patient goals for therapy: my job, heavy workouts, sleep better         Objective   CERVICAL SPINE EVALUATION  PAIN: Pain Level at Rest: 2/10  Pain Level at worst: 5/10  Pain Location/present symptoms: mid-lower cervical spine, midline-right. Some headache. No radicular sx.   Paresthesia (yes/no):  none  Pain Quality: Aching and Dull  Pain Frequency: mostly constant  Pain is Worst: both, slightly worse at night  Pain is Exacerbated By: lying down, incr activity, sitting and looking down  Pain is Relieved By: tylenol, acupressure mat  Pain Progression: Unchanged    ADDITIONAL HISTORY:  Disturbed sleep (yes/no): yes   Sleeping postures (prone/sup/side R/L): side and back    Postural Observation:   Sitting: Neutral - slight rounded  shoulders at times  Protruded head: No     Movement Loss:   Reed Mod Min Nil Symptoms   Protrusion    x PDM   Flexion    x Incr pain   Retraction     NE   Extension   x  NE   Lateral flexion R   x  Left stretch   Lateral flexion L    x NE   Rotation R    x NE   Rotation L    x NE     Thoracic ROM:    Left AROM Right AROM    Thoracic Flexion WNL - NE    Thoracic Extension WNL - NE    Thoracic Rotation WNL - NE WNL - NE      Test Movements:   During: produces, abolishes, increases, decreases, no effect, centralizing, peripheralizing  After: better, worse, no better, no worse, no effect, centralized, peripheralized    Symptomatic response Mechanical response    During testing After testing Effect - increased ROM, decreased ROM, or key functional test No Effect   Pretest symptoms sittin/10 midline- lower neck     Rep PRO         Rep RET No Effect    NE        Rep RET EXT Increases Worse                 Pretest symptoms lying:  3/10   During testing After testing Effect - increased ROM, decreased ROM, or key functional test No Effect   Rep RET No Effect    No Effect        Rep RET EXT           SUPINE MANUAL TRACTION AND TRACTION + RETRACTION - better after and improved tolerance to sitting retraction and ext after        Assessment & Plan   CLINICAL IMPRESSIONS  Medical Diagnosis: neck pain    Treatment Diagnosis: neck pain   Impression/Assessment: Patient is a 27 year old female with neck complaints.  The following significant findings have been identified: Pain, Decreased ROM/flexibility, Decreased activity tolerance, and Impaired posture. These impairments interfere with their ability to perform self care tasks, work tasks, recreational activities, and household chores as compared to previous level of function.     Clinical Decision Making (Complexity):  Clinical Presentation: Stable/Uncomplicated  Clinical Presentation Rationale: based on medical and personal factors listed in PT evaluation  Clinical Decision  Making (Complexity): Low complexity    PLAN OF CARE  Treatment Interventions:  Interventions: Manual Therapy, Neuromuscular Re-education, Therapeutic Activity, Therapeutic Exercise    Long Term Goals     PT Goal 1  Goal Identifier: neck/sleeping  Goal Description: pt will be able to sleep in preferred sleeping position with pain 0-1/10 throughout the night  Rationale:  (to return to restorative sleep pattern)  Target Date: 09/26/23      Frequency of Treatment: 1x/week for 3 weeks, then 2x/month for 6 weeks  Duration of Treatment: 9 weeks    Education Assessment:        Risks and benefits of evaluation/treatment have been explained.   Patient/Family/caregiver agrees with Plan of Care.     Evaluation Time:     PT Eval, Low Complexity Minutes (28935): 15     Signing Clinician: Elizabeth Malik PT

## 2023-07-26 ENCOUNTER — TRANSCRIBE ORDERS (OUTPATIENT)
Dept: OTHER | Age: 28
End: 2023-07-26

## 2023-07-26 DIAGNOSIS — M54.50 ACUTE LOW BACK PAIN: Primary | ICD-10-CM

## 2023-10-30 ENCOUNTER — OFFICE VISIT (OUTPATIENT)
Dept: URGENT CARE | Facility: URGENT CARE | Age: 28
End: 2023-10-30
Payer: COMMERCIAL

## 2023-10-30 VITALS
SYSTOLIC BLOOD PRESSURE: 100 MMHG | HEART RATE: 71 BPM | DIASTOLIC BLOOD PRESSURE: 69 MMHG | OXYGEN SATURATION: 100 % | TEMPERATURE: 97.5 F

## 2023-10-30 DIAGNOSIS — R39.89 URINARY PROBLEM: Primary | ICD-10-CM

## 2023-10-30 DIAGNOSIS — N39.0 URINARY TRACT INFECTION WITHOUT HEMATURIA, SITE UNSPECIFIED: ICD-10-CM

## 2023-10-30 LAB
ALBUMIN UR-MCNC: NEGATIVE MG/DL
APPEARANCE UR: CLEAR
BACTERIA #/AREA URNS HPF: ABNORMAL /HPF
BILIRUB UR QL STRIP: NEGATIVE
CLUE CELLS: ABNORMAL
COLOR UR AUTO: YELLOW
GLUCOSE UR STRIP-MCNC: NEGATIVE MG/DL
HGB UR QL STRIP: ABNORMAL
KETONES UR STRIP-MCNC: NEGATIVE MG/DL
LEUKOCYTE ESTERASE UR QL STRIP: NEGATIVE
NITRATE UR QL: NEGATIVE
PH UR STRIP: 6 [PH] (ref 5–7)
RBC #/AREA URNS AUTO: ABNORMAL /HPF
SP GR UR STRIP: 1.02 (ref 1–1.03)
SQUAMOUS #/AREA URNS AUTO: ABNORMAL /LPF
TRICHOMONAS, WET PREP: ABNORMAL
UROBILINOGEN UR STRIP-ACNC: 0.2 E.U./DL
WBC #/AREA URNS AUTO: ABNORMAL /HPF
WBC'S/HIGH POWER FIELD, WET PREP: ABNORMAL
YEAST, WET PREP: ABNORMAL

## 2023-10-30 PROCEDURE — 81001 URINALYSIS AUTO W/SCOPE: CPT

## 2023-10-30 PROCEDURE — 87210 SMEAR WET MOUNT SALINE/INK: CPT

## 2023-10-30 PROCEDURE — 87591 N.GONORRHOEAE DNA AMP PROB: CPT

## 2023-10-30 PROCEDURE — 99213 OFFICE O/P EST LOW 20 MIN: CPT

## 2023-10-30 PROCEDURE — 87491 CHLMYD TRACH DNA AMP PROBE: CPT

## 2023-10-30 RX ORDER — DEXTROAMPHETAMINE SACCHARATE, AMPHETAMINE ASPARTATE, DEXTROAMPHETAMINE SULFATE AND AMPHETAMINE SULFATE 2.5; 2.5; 2.5; 2.5 MG/1; MG/1; MG/1; MG/1
1 TABLET ORAL
COMMUNITY
Start: 2023-10-25

## 2023-10-30 RX ORDER — NITROFURANTOIN 25; 75 MG/1; MG/1
100 CAPSULE ORAL 2 TIMES DAILY
Qty: 14 CAPSULE | Refills: 0 | Status: SHIPPED | OUTPATIENT
Start: 2023-10-30 | End: 2023-11-06

## 2023-10-30 NOTE — PROGRESS NOTES
Assessment & Plan     Urinary problem  Wet prep neg  STD pending  - UA Macroscopic with reflex to Microscopic and Culture - Clinic Collect  - Chlamydia trachomatis/Neisseria gonorrhoeae by PCR - Clinic Collect  - Wet prep - Clinic Collect  - UA Microscopic with Reflex to Culture    Urinary tract infection without hematuria, site unspecified  UTI  - nitroFURantoin macrocrystal-monohydrate (MACROBID) 100 MG capsule  Dispense: 14 capsule; Refill: 0             No follow-ups on file.    [facility] Urgent Care Provider  [facility] ILENE Wells is a 27 year old female who presents to clinic today for the following health issues:  Chief Complaint   Patient presents with    Urinary Problem     Frequency x 3 weeks, also wants STD testing     HPI    Frequency of urination  No fever  No change in back pain  No abd pain    STD screening        Review of Systems        Objective    /69 (BP Location: Left arm, Patient Position: Sitting, Cuff Size: Adult Regular)   Pulse 71   Temp 97.5  F (36.4  C) (Tympanic)   LMP 10/26/2023 (Approximate)   SpO2 100%   Physical Exam  Vitals and nursing note reviewed.   Constitutional:       Appearance: Normal appearance.   Eyes:      Pupils: Pupils are equal, round, and reactive to light.   Cardiovascular:      Rate and Rhythm: Normal rate and regular rhythm.      Pulses: Normal pulses.      Heart sounds: Normal heart sounds.   Pulmonary:      Effort: Pulmonary effort is normal.      Breath sounds: Normal breath sounds.   Abdominal:      General: Abdomen is flat.      Palpations: Abdomen is soft.   Musculoskeletal:      Cervical back: Neck supple.   Neurological:      Mental Status: She is alert.

## 2023-10-31 LAB
C TRACH DNA SPEC QL PROBE+SIG AMP: NEGATIVE
N GONORRHOEA DNA SPEC QL NAA+PROBE: NEGATIVE

## 2023-11-01 PROBLEM — M54.42 BILATERAL LOW BACK PAIN WITH LEFT-SIDED SCIATICA: Status: RESOLVED | Noted: 2023-06-28 | Resolved: 2023-11-01

## 2023-11-01 PROBLEM — M54.6 ACUTE LEFT-SIDED THORACIC BACK PAIN: Status: RESOLVED | Noted: 2023-06-28 | Resolved: 2023-11-01

## 2023-11-01 PROBLEM — M54.2 NECK PAIN: Status: RESOLVED | Noted: 2023-07-25 | Resolved: 2023-11-01

## 2023-11-01 PROBLEM — M54.50 ACUTE RIGHT-SIDED LOW BACK PAIN WITHOUT SCIATICA: Status: RESOLVED | Noted: 2023-06-28 | Resolved: 2023-11-01

## 2023-11-01 NOTE — PROGRESS NOTES
Patient seen one time for evaluation and treatment on Jul 25, 2023.  Patient did not return for further treatment to complete their physical therapy plan of care, and current status is unknown.  Please see initial evaluation dated Jul 25, 2023  for further information/discharge information.   Discharge patient from PT at this time.

## 2023-11-01 NOTE — PROGRESS NOTES
Discharge Note    Progress reporting period is from June 28, 2023 to Jul 18, 2023.     Priscilla failed to return for next follow up visit and current status is unknown.  Please see information below for last relevant information on current status.  SUBJECTIVE  Subjective changes noted by patient:  pt reports low back has been better this past week. sleeping improved. did have a MRI on thursday but has not heard results, does have appt on Monday though to hear. walking and standing are painfree. hips still pop. some yoga positions can irritate during, and the HEP some can be sore during, but it goes away right after.    .  OBJECTIVE  Changes noted in objective findings:   no incr in pain with progressed HEP, good ability to keep lumbar neutral   ASSESSMENT/PLAN      Updated problem list and treatment plan:   Pain - HEP  Decreased function - HEP  STG/LTGs have been met or progress has been made towards goals:  Yes, please see goal flowsheet for most current information  Assessment of Progress: current status is unknown.   Self Management Plans:  HEP  I have re-evaluated this patient and find that the nature, scope, duration and intensity of the therapy is appropriate for the medical condition of the patient.  Priscilla continues to require the following intervention to meet STG and LTG's:  HEP.    Recommendations:  Discharge with current home program.  Patient to follow up with MD as needed.    Please refer to the daily flowsheet for treatment today, total treatment time and time spent performing 1:1 timed codes.

## 2023-12-09 ENCOUNTER — HEALTH MAINTENANCE LETTER (OUTPATIENT)
Age: 28
End: 2023-12-09

## 2024-04-18 DIAGNOSIS — L30.9 DERMATITIS: Primary | ICD-10-CM

## 2024-04-18 RX ORDER — TRIAMCINOLONE ACETONIDE 1 MG/G
CREAM TOPICAL 2 TIMES DAILY
Qty: 28.4 G | Refills: 2 | Status: SHIPPED | OUTPATIENT
Start: 2024-04-18

## 2024-06-01 ENCOUNTER — OFFICE VISIT (OUTPATIENT)
Dept: URGENT CARE | Facility: URGENT CARE | Age: 29
End: 2024-06-01
Payer: COMMERCIAL

## 2024-06-01 VITALS
OXYGEN SATURATION: 96 % | DIASTOLIC BLOOD PRESSURE: 62 MMHG | HEART RATE: 90 BPM | TEMPERATURE: 98.9 F | SYSTOLIC BLOOD PRESSURE: 99 MMHG

## 2024-06-01 DIAGNOSIS — A69.20 LYME DISEASE: Primary | ICD-10-CM

## 2024-06-01 PROCEDURE — 99213 OFFICE O/P EST LOW 20 MIN: CPT | Performed by: FAMILY MEDICINE

## 2024-06-01 RX ORDER — DOXYCYCLINE HYCLATE 100 MG
100 TABLET ORAL 2 TIMES DAILY
Qty: 20 TABLET | Refills: 0 | Status: SHIPPED | OUTPATIENT
Start: 2024-06-01 | End: 2024-06-11

## 2024-06-01 NOTE — PROGRESS NOTES
Assessment & Plan     Lyme disease    - doxycycline hyclate (VIBRA-TABS) 100 MG tablet; Take 1 tablet (100 mg) by mouth 2 times daily for 10 days     Treat with doxycycline for presumed Lyme's with erythema migrans noted on LEFT wrist in past week after trip to [place] last weekend.  Close Follow-up if no change or new or worsening sx prn.    Patrizia Riley MD  [facility]   Priscilla is a 28 year old, presenting for the following health issues:  Urgent Care (Lyme disease on left arm had the bulls eye x 7 days and now has joint pain)    HPI     Noted bull's eye rash last week on LEFT wrist after trip to [place] last weekend- never saw a tick bite.  Now with migrating arthralgias.  Denies fever.  No other new rashes.  Rash on LEFT wrist has since gone away.    Ambulating without pain.      Review of Systems  Constitutional, neuro, ENT, endocrine, pulmonary, cardiac, gastrointestinal, genitourinary, musculoskeletal, integument and psychiatric systems are negative, except as otherwise noted.      Objective    BP 99/62 (BP Location: Right arm, Patient Position: Sitting, Cuff Size: Child)   Pulse 90   Temp 98.9  F (37.2  C) (Tympanic)   SpO2 96%   Breastfeeding No   There is no height or weight on file to calculate BMI.  Physical Exam   GENERAL: alert and no distress  EYES: Eyes grossly normal to inspection, PERRL and conjunctivae and sclerae normal  RESP: lungs clear to auscultation - no rales, rhonchi or wheezes  CV: regular rate and rhythm, normal S1 S2, no S3 or S4, no murmur, click or rub, no peripheral edema   SKIN: no suspicious lesions or rashes  MUSCULOSKELETAL: No red, hot swollen joints noted today.  PSYCH: mentation appears normal, affect normal/bright        Signed Electronically by: Chica Riley MD

## 2025-01-11 ENCOUNTER — HEALTH MAINTENANCE LETTER (OUTPATIENT)
Age: 30
End: 2025-01-11

## 2025-02-02 ENCOUNTER — MEDICAL CORRESPONDENCE (OUTPATIENT)
Dept: HEALTH INFORMATION MANAGEMENT | Facility: CLINIC | Age: 30
End: 2025-02-02

## 2025-02-03 DIAGNOSIS — R00.0 TACHYCARDIA: ICD-10-CM

## 2025-02-03 DIAGNOSIS — F41.9 ANXIETY: ICD-10-CM

## 2025-02-03 DIAGNOSIS — R00.2 PALPITATIONS: Primary | ICD-10-CM
